# Patient Record
Sex: FEMALE | Race: WHITE | Employment: OTHER | URBAN - METROPOLITAN AREA
[De-identification: names, ages, dates, MRNs, and addresses within clinical notes are randomized per-mention and may not be internally consistent; named-entity substitution may affect disease eponyms.]

---

## 2023-02-07 DIAGNOSIS — M25.562 LEFT KNEE PAIN, UNSPECIFIED CHRONICITY: Primary | ICD-10-CM

## 2023-02-08 ENCOUNTER — OFFICE VISIT (OUTPATIENT)
Dept: ORTHOPEDIC SURGERY | Age: 78
End: 2023-02-08
Payer: MEDICARE

## 2023-02-08 ENCOUNTER — TELEPHONE (OUTPATIENT)
Dept: ORTHOPEDIC SURGERY | Age: 78
End: 2023-02-08

## 2023-02-08 VITALS — BODY MASS INDEX: 28.25 KG/M2 | WEIGHT: 180 LBS | TEMPERATURE: 98 F | HEIGHT: 67 IN

## 2023-02-08 DIAGNOSIS — M17.12 PRIMARY OSTEOARTHRITIS OF LEFT KNEE: Primary | ICD-10-CM

## 2023-02-08 PROCEDURE — 99204 OFFICE O/P NEW MOD 45 MIN: CPT | Performed by: ORTHOPAEDIC SURGERY

## 2023-02-08 PROCEDURE — 1123F ACP DISCUSS/DSCN MKR DOCD: CPT | Performed by: ORTHOPAEDIC SURGERY

## 2023-02-08 RX ORDER — OMEPRAZOLE 20 MG/1
CAPSULE, DELAYED RELEASE ORAL
COMMUNITY
Start: 2022-12-21

## 2023-02-08 RX ORDER — PROPAFENONE HYDROCHLORIDE 150 MG/1
TABLET, FILM COATED ORAL
COMMUNITY
Start: 2023-02-06

## 2023-02-08 RX ORDER — LETROZOLE 2.5 MG/1
TABLET, FILM COATED ORAL
COMMUNITY
Start: 2022-11-06

## 2023-02-08 RX ORDER — ASPIRIN 81 MG/1
81 TABLET ORAL DAILY
COMMUNITY

## 2023-02-08 RX ORDER — CLOPIDOGREL BISULFATE 75 MG/1
TABLET ORAL
COMMUNITY
Start: 2023-02-03

## 2023-02-08 NOTE — TELEPHONE ENCOUNTER
Prior Authorization Form:      DEMOGRAPHICS:                     Patient Name:  Mercy Reeves  Patient :  1945            Insurance:  Payor: Jennifer Thomas / Plan: [address] / Product Type: *No Product type* /   Insurance ID Number:    Payer/Plan Subscr  Sex Relation Sub. Ins. ID Effective Group Num   1.  BCBS MEDICARESudha Monzon A 1945 Female Self [id number] [id number]                                    [id number]         DIAGNOSIS & PROCEDURE:                       Procedure/Operation: TOTAL LEFT KNEE REPLACEMENT ARTHROPLASTY           CPT Code: 57224    Diagnosis:  DEGENERATION LEFT KNEE    ICD10 Code: M17.12    Location:  [facility]    Surgeon:  Jeanne Daniel D.O.    SCHEDULING INFORMATION:                          Date: 2023    Time: TBA                Anesthesia:  Spinal                                                       Status:  Outpatient        Special Comments:  Julito Evans 13.       Electronically signed by Amanda Klinefelter on 2023 at 1:42 PM

## 2023-02-08 NOTE — PROGRESS NOTES
Chao Nelson is a 76 y.o. female, who presents   Chief Complaint   Patient presents with    Knee Pain     Left knee pain for for a while over a few years comes and goes. HPI[de-identified] Knee pains been present for quite some time. Mrs. Candice Voss was taking care of her  who was on dialysis and passed in January. The knee pain is gotten worse over time and is disabling at this point she uses a wheeled seated walker as an ambulatory aid. She is basically healthy at this point and would like to plan some travel and be active throughout the rest of her life. The left knee is interfering with this    Allergies; medications; past medical, surgical, family, and social history; and problem list have been reviewed today and updated as indicated in this encounter - see below following Ortho specifics. Musculoskeletal: Skin condition gross neurovascular functions good in the left lower extremity. Hip range of motion is good without pain. There is no instability. The left knee range of motion is 0 to 120 degrees of flexion. There is significant medial gapping with stress examination. Collateral crucial ligaments are grossly stable. There is crepitus throughout the range of motion as well. He does have discomfort with stressing Keyshawn testing. Radiologic Studies: Imaging of the left knee and 2 views with AP weightbearing shows significant varus malalignment with osteopenia. There are hypertrophic marginal changes medial and patellofemoral.  She has bone-on-bone contact in the medial compartment. ASSESSMENT:  Horacio Ritchie was seen today for knee pain. Diagnoses and all orders for this visit:    Primary osteoarthritis of left knee     Treatment alternatives were reviewed including medical and physical therapies, injections, and surgical options, expected risks benefits and likely outcome of each were discussed in detail, questions asked and answered and understood.   We discussed the symptoms well physical findings and imaging results. There is advanced arthrosis in the left knee. We discussed treatment options and she wants a solution rather than palliative care. This would be a total knee replacement which was discussed in detail with her with parent good understanding. PLAN: We will plan left total knee replacement arthroplasty. There is no problem list on file for this patient. History reviewed. No pertinent past medical history. History reviewed. No pertinent surgical history. Current Outpatient Medications   Medication Sig Dispense Refill    propafenone (RYTHMOL) 150 MG tablet       omeprazole (PRILOSEC) 20 MG delayed release capsule TAKE 1 CAPSULE BY MOUTH EVERY DAY FOR ACID REFLUX      letrozole (FEMARA) 2.5 MG tablet TAKE 1 TABLET BY MOUTH DAILY      clopidogrel (PLAVIX) 75 MG tablet       aspirin 81 MG EC tablet Take 81 mg by mouth daily       No current facility-administered medications for this visit. No Known Allergies    Social History     Socioeconomic History    Marital status:      Spouse name: None    Number of children: None    Years of education: None    Highest education level: None       History reviewed. No pertinent family history. Review of Systems:   As follows except as previously noted in HPI:  Constitutional: Negative for chills, diaphoresis,  fever   Respiratory: Negative for cough, shortness of breath and wheezing. Cardiovascular: Negative for chest pain and palpitations. Neurological: Negative for dizziness, syncope,   GI / : abdominal pain or cramping  Musculoskeletal: see HPI       Objective:   Physical Exam   Constitutional: Oriented to person, place, and time. and appears well-developed and well-nourished. :   Head: Normocephalic and atraumatic. Neck: Neck supple. Eyes: EOM are normal.   Pulmonary/Chest: Effort normal.  No respiratory distress, no wheezes. Neurological: Alert and oriented to person  Skin: Skin is warm and dry. David Dorsey DO    2/8/23  10:37 AM    All reasonable efforts have been made to minimize the risk of errors that may occur in the use of voice recognition and other electronic means of charting.

## 2023-03-07 RX ORDER — SODIUM CHLORIDE, SODIUM LACTATE, POTASSIUM CHLORIDE, CALCIUM CHLORIDE 600; 310; 30; 20 MG/100ML; MG/100ML; MG/100ML; MG/100ML
INJECTION, SOLUTION INTRAVENOUS CONTINUOUS
OUTPATIENT
Start: 2023-03-14

## 2023-03-08 RX ORDER — SODIUM CHLORIDE 0.9 % (FLUSH) 0.9 %
5-40 SYRINGE (ML) INJECTION EVERY 12 HOURS SCHEDULED
OUTPATIENT
Start: 2023-03-08

## 2023-03-08 RX ORDER — SODIUM CHLORIDE 0.9 % (FLUSH) 0.9 %
5-40 SYRINGE (ML) INJECTION PRN
OUTPATIENT
Start: 2023-03-08

## 2023-03-08 RX ORDER — SODIUM CHLORIDE 9 MG/ML
INJECTION, SOLUTION INTRAVENOUS PRN
OUTPATIENT
Start: 2023-03-08

## 2023-03-08 RX ORDER — SCOLOPAMINE TRANSDERMAL SYSTEM 1 MG/1
1 PATCH, EXTENDED RELEASE TRANSDERMAL
OUTPATIENT
Start: 2023-03-08 | End: 2023-03-11

## 2023-03-09 ENCOUNTER — HOSPITAL ENCOUNTER (OUTPATIENT)
Dept: PREADMISSION TESTING | Age: 78
Discharge: HOME | End: 2023-03-09
Payer: MEDICARE

## 2023-03-09 ENCOUNTER — ANESTHESIA EVENT (OUTPATIENT)
Dept: OPERATING ROOM | Age: 78
End: 2023-03-09
Payer: MEDICARE

## 2023-03-09 ENCOUNTER — HOSPITAL ENCOUNTER (OUTPATIENT)
Dept: GENERAL RADIOLOGY | Age: 78
Discharge: HOME | End: 2023-03-11
Payer: MEDICARE

## 2023-03-09 VITALS
RESPIRATION RATE: 18 BRPM | WEIGHT: 203.5 LBS | BODY MASS INDEX: 31.87 KG/M2 | DIASTOLIC BLOOD PRESSURE: 79 MMHG | SYSTOLIC BLOOD PRESSURE: 152 MMHG | TEMPERATURE: 97.8 F | HEART RATE: 64 BPM | OXYGEN SATURATION: 97 %

## 2023-03-09 DIAGNOSIS — M17.12 OSTEOARTHRITIS OF LEFT KNEE, UNSPECIFIED OSTEOARTHRITIS TYPE: ICD-10-CM

## 2023-03-09 DIAGNOSIS — M17.12 PRIMARY OSTEOARTHRITIS OF LEFT KNEE: Primary | ICD-10-CM

## 2023-03-09 LAB
ALBUMIN SERPL-MCNC: 4.1 G/DL (ref 3.5–5.2)
ALP BLD-CCNC: 79 U/L (ref 35–104)
ALT SERPL-CCNC: 10 U/L (ref 0–32)
ANION GAP SERPL CALCULATED.3IONS-SCNC: 7 MMOL/L (ref 7–16)
APTT: 32.9 SEC (ref 24.5–35.1)
AST SERPL-CCNC: 15 U/L (ref 0–31)
BASOPHILS ABSOLUTE: 0.02 E9/L (ref 0–0.2)
BASOPHILS RELATIVE PERCENT: 0.3 % (ref 0–2)
BILIRUB SERPL-MCNC: 0.4 MG/DL (ref 0–1.2)
BILIRUBIN URINE: NEGATIVE
BLOOD, URINE: NEGATIVE
BUN BLDV-MCNC: 12 MG/DL (ref 6–23)
CALCIUM SERPL-MCNC: 9.4 MG/DL (ref 8.6–10.2)
CHLORIDE BLD-SCNC: 108 MMOL/L (ref 98–107)
CLARITY: CLEAR
CO2: 27 MMOL/L (ref 22–29)
COLOR: YELLOW
CREAT SERPL-MCNC: 0.7 MG/DL (ref 0.5–1)
EOSINOPHILS ABSOLUTE: 0.12 E9/L (ref 0.05–0.5)
EOSINOPHILS RELATIVE PERCENT: 2 % (ref 0–6)
GFR SERPL CREATININE-BSD FRML MDRD: >60 ML/MIN/1.73
GLUCOSE BLD-MCNC: 87 MG/DL (ref 74–99)
GLUCOSE URINE: NEGATIVE MG/DL
HBA1C MFR BLD: 5.1 % (ref 4–5.6)
HCT VFR BLD CALC: 46 % (ref 34–48)
HEMOGLOBIN: 14.3 G/DL (ref 11.5–15.5)
IMMATURE GRANULOCYTES #: 0.02 E9/L
IMMATURE GRANULOCYTES %: 0.3 % (ref 0–5)
INR BLD: 0.9
KETONES, URINE: NEGATIVE MG/DL
LEUKOCYTE ESTERASE, URINE: NEGATIVE
LYMPHOCYTES ABSOLUTE: 1.44 E9/L (ref 1.5–4)
LYMPHOCYTES RELATIVE PERCENT: 23.8 % (ref 20–42)
MCH RBC QN AUTO: 29.1 PG (ref 26–35)
MCHC RBC AUTO-ENTMCNC: 31.1 % (ref 32–34.5)
MCV RBC AUTO: 93.7 FL (ref 80–99.9)
MONOCYTES ABSOLUTE: 0.47 E9/L (ref 0.1–0.95)
MONOCYTES RELATIVE PERCENT: 7.8 % (ref 2–12)
NEUTROPHILS ABSOLUTE: 3.97 E9/L (ref 1.8–7.3)
NEUTROPHILS RELATIVE PERCENT: 65.8 % (ref 43–80)
NITRITE, URINE: NEGATIVE
PDW BLD-RTO: 14.8 FL (ref 11.5–15)
PH UA: 6 (ref 5–9)
PLATELET # BLD: 208 E9/L (ref 130–450)
PMV BLD AUTO: 11 FL (ref 7–12)
POTASSIUM REFLEX MAGNESIUM: 4.4 MMOL/L (ref 3.5–5)
PREALBUMIN: 23 MG/DL (ref 20–40)
PROTEIN UA: NEGATIVE MG/DL
PROTHROMBIN TIME: 10.8 SEC (ref 9.3–12.4)
RBC # BLD: 4.91 E12/L (ref 3.5–5.5)
SODIUM BLD-SCNC: 142 MMOL/L (ref 132–146)
SPECIFIC GRAVITY UA: >=1.03 (ref 1–1.03)
TOTAL PROTEIN: 7 G/DL (ref 6.4–8.3)
UROBILINOGEN, URINE: 0.2 E.U./DL
WBC # BLD: 6 E9/L (ref 4.5–11.5)

## 2023-03-09 PROCEDURE — 36415 COLL VENOUS BLD VENIPUNCTURE: CPT

## 2023-03-09 PROCEDURE — 84134 ASSAY OF PREALBUMIN: CPT

## 2023-03-09 PROCEDURE — 85025 COMPLETE CBC W/AUTO DIFF WBC: CPT

## 2023-03-09 PROCEDURE — 87088 URINE BACTERIA CULTURE: CPT

## 2023-03-09 PROCEDURE — 81003 URINALYSIS AUTO W/O SCOPE: CPT

## 2023-03-09 PROCEDURE — 80053 COMPREHEN METABOLIC PANEL: CPT

## 2023-03-09 PROCEDURE — 83036 HEMOGLOBIN GLYCOSYLATED A1C: CPT

## 2023-03-09 PROCEDURE — 85730 THROMBOPLASTIN TIME PARTIAL: CPT

## 2023-03-09 PROCEDURE — 85610 PROTHROMBIN TIME: CPT

## 2023-03-09 PROCEDURE — 87081 CULTURE SCREEN ONLY: CPT

## 2023-03-09 PROCEDURE — 71046 X-RAY EXAM CHEST 2 VIEWS: CPT

## 2023-03-09 RX ORDER — MIDAZOLAM HYDROCHLORIDE 1 MG/ML
2 INJECTION INTRAMUSCULAR; INTRAVENOUS ONCE
OUTPATIENT
Start: 2023-03-14

## 2023-03-09 RX ORDER — FENTANYL CITRATE 50 UG/ML
100 INJECTION, SOLUTION INTRAMUSCULAR; INTRAVENOUS ONCE
OUTPATIENT
Start: 2023-03-14

## 2023-03-09 RX ORDER — ROPIVACAINE HYDROCHLORIDE 5 MG/ML
30 INJECTION, SOLUTION EPIDURAL; INFILTRATION; PERINEURAL ONCE
OUTPATIENT
Start: 2023-03-14

## 2023-03-09 ASSESSMENT — PAIN DESCRIPTION - PAIN TYPE: TYPE: CHRONIC PAIN

## 2023-03-09 ASSESSMENT — PAIN DESCRIPTION - ONSET: ONSET: ON-GOING

## 2023-03-09 ASSESSMENT — PAIN SCALES - GENERAL: PAINLEVEL_OUTOF10: 5

## 2023-03-09 ASSESSMENT — PAIN DESCRIPTION - LOCATION: LOCATION: KNEE

## 2023-03-09 ASSESSMENT — PAIN DESCRIPTION - DESCRIPTORS: DESCRIPTORS: ACHING

## 2023-03-09 ASSESSMENT — PAIN DESCRIPTION - ORIENTATION: ORIENTATION: LEFT

## 2023-03-09 NOTE — ANESTHESIA PRE PROCEDURE
Department of Anesthesiology  Preprocedure Note       Name:  Jay Galdamez   Age:  76 y.o.  :  1945                                          MRN:  [id number]         Date:  3/9/2023      Surgeon: Oscar Nova):  JOLEEN Swan DO    Procedure: Procedure(s):  LEFT KNEE TOTAL ARTHROPLASTY    Medications prior to admission:   Prior to Admission medications    Medication Sig Start Date End Date Taking? Authorizing Provider   propafenone (RYTHMOL) 150 MG tablet Take 150 mg by mouth in the morning and 150 mg at noon and 150 mg in the evening. 23   Historical Provider, MD   omeprazole (PRILOSEC) 20 MG delayed release capsule Take 20 mg by mouth 2 times daily as needed 22   Historical Provider, MD   letrozole [facility]) 2.5 MG tablet TAKE 1 TABLET BY MOUTH DAILY 22   Historical Provider, MD   clopidogrel (PLAVIX) 75 MG tablet Take 75 mg by mouth daily 2/3/23   Historical Provider, MD   aspirin 81 MG EC tablet Take 81 mg by mouth daily    Historical Provider, MD       Current medications:    Current Outpatient Medications   Medication Sig Dispense Refill    propafenone (RYTHMOL) 150 MG tablet Take 150 mg by mouth in the morning and 150 mg at noon and 150 mg in the evening.  omeprazole (PRILOSEC) 20 MG delayed release capsule Take 20 mg by mouth 2 times daily as needed      letrozole (FEMARA) 2.5 MG tablet TAKE 1 TABLET BY MOUTH DAILY      clopidogrel (PLAVIX) 75 MG tablet Take 75 mg by mouth daily      aspirin 81 MG EC tablet Take 81 mg by mouth daily       No current facility-administered medications for this encounter.        Allergies:  No Known Allergies    Problem List:    Patient Active Problem List   Diagnosis Code    Degenerative arthritis of left knee M17.12       Past Medical History:        Diagnosis Date    [facility] [address]     Arthritis     Cancer ([facility] [address]     BREAST    GERD (gastroesophageal reflux disease)     Osteoporosis     Thoracic aortic aneurysm        Past Surgical History: Procedure Laterality Date    BREAST SURGERY      lumpectomy right breast    COLONOSCOPY      ENDOSCOPY, COLON, DIAGNOSTIC      VASCULAR SURGERY      thoracic aortic aneurysm repair       Social History:    Social History     Tobacco Use    Smoking status: Never    Smokeless tobacco: Never   Substance Use Topics    Alcohol use: Not Currently                                Counseling given: Not Answered      Vital Signs (Current):   Vitals:    03/09/23 0810   BP: (!) 152/79   Pulse: 64   Resp: 18   Temp: 97.8 °F (36.6 °C)   TempSrc: Infrared   SpO2: 97%   Weight: 203 lb 8 oz (92.3 kg)                                              BP Readings from Last 3 Encounters:   03/09/23 (!) 152/79       NPO Status:                                                                                 BMI:   Wt Readings from Last 3 Encounters:   03/09/23 203 lb 8 oz (92.3 kg)   02/08/23 180 lb (81.6 kg)     Body mass index is 31.87 kg/m². CBC: No results found for: WBC, RBC, HGB, HCT, MCV, RDW, PLT    CMP: No results found for: NA, K, CL, CO2, BUN, CREATININE, GFRAA, AGRATIO, LABGLOM, GLUCOSE, GLU, PROT, CALCIUM, BILITOT, ALKPHOS, AST, ALT    POC Tests: No results for input(s): POCGLU, POCNA, POCK, POCCL, POCBUN, POCHEMO, POCHCT in the last 72 hours.     Coags: No results found for: PROTIME, INR, APTT    HCG (If Applicable): No results found for: PREGTESTUR, PREGSERUM, HCG, HCGQUANT     ABGs: No results found for: PHART, PO2ART, RCQ4WQT, PUX0NJV, BEART, I6FTPUDG     Type & Screen (If Applicable):  No results found for: LABABO, LABRH    Drug/Infectious Status (If Applicable):  No results found for: HIV, HEPCAB    COVID-19 Screening (If Applicable): No results found for: COVID19        Anesthesia Evaluation  Patient summary reviewed  Airway: Mallampati: III  TM distance: >3 FB     Mouth opening: > = 3 FB   Dental:    (+) partials and upper dentures      Pulmonary:Negative Pulmonary ROS breath sounds clear to auscultation                             Cardiovascular:Negative CV ROS          ECG reviewed  Rhythm: regular  Rate: normal    Stress test reviewed  Cleared by cardiology              Neuro/Psych:   Negative Neuro/Psych ROS              GI/Hepatic/Renal:   (+) GERD:,           Endo/Other:    (+) : arthritis:., .                 Abdominal:   (+) obese,           Vascular:   + PVD, aortic or cerebral, . Other Findings: Missing teeth. Anesthesia Plan      general and spinal     ASA 3     (Pt accepting of spinal with general backup and adductor canal block for LTKR.)  Induction: intravenous. Anesthetic plan and risks discussed with patient.                         Delores Mendez MD   3/9/2023

## 2023-03-09 NOTE — PROGRESS NOTES
[address] [facility]                                                                                                                    PRE OP INSTRUCTIONS FOR  Zahra Cancel        Date: 3/9/2023    Date of surgery: 3/14/23   Arrival Time: Hospital will call you between 5pm and 7pm Monday evening with your final arrival time for surgery    Do not eat or drink anything after midnight prior to surgery. This includes no water, chewing gum, mints or ice chips. Take the following medications with a small sip of water on the morning of Surgery: Rhythmol, Omeprazole     Diabetics may take evening dose of insulin but none after midnight. If you feel symptomatic or low blood sugar morning of surgery drink 1-2 ounces of apple juice only. Aspirin, Ibuprofen, Advil, Naproxen, Vitamin E and other Anti-inflammatory products should be stopped  before surgery  as directed by your physician. Take Tylenol only unless instructed otherwise by your surgeon. Check with your Doctor regarding stopping Plavix, Coumadin, Lovenox, Eliquis, Effient, or other blood thinners. Do not smoke,use illicit drugs and do not drink any alcoholic beverages 24 hours prior to surgery. You may brush your teeth the morning of surgery. DO NOT SWALLOW WATER    You MUST make arrangements for a responsible adult to take you home after your surgery. You will not be allowed to leave alone or drive yourself home. It is strongly suggested someone stay with you the first 24 hrs. Your surgery will be cancelled if you do not have a ride home. PEDIATRIC PATIENTS ONLY:  A parent/legal guardian must accompany a child scheduled for surgery and plan to stay at the hospital until the child is discharged. Please do not bring other children with you.     Please wear simple, loose fitting clothing to the hospital.  Maritza Nilton not bring valuables (money, credit cards, checkbooks, etc.) Do not wear any makeup (including no eye makeup) or nail polish on your fingers or toes. DO NOT wear any jewelry or piercings on day of surgery. All body piercing jewelry must be removed. Shower the night before surgery with _x__Antibacterial soap /AC WIPES___x_____    TOTAL JOINT REPLACEMENT/HYSTERECTOMY PATIENTS ONLY---Remember to bring Blood Bank bracelet to the hospital on the day of surgery. If you have a Living Will and Durable Power of  for Healthcare, please bring in a copy. If appropriate bring crutches, inspirex, WALKER, CANE etc... Notify your Surgeon if you develop any illness between now and surgery time, cough, cold, fever, sore throat, nausea, vomiting, etc.  Please notify your surgeon if you experience dizziness, shortness of breath or blurred vision between now & the time of your surgery. If you have _x__dentures, they will be removed before going to the OR; we will provide you a container. If you wear ___contact lenses or _x__glasses, they will be removed; please bring a case for them. To provide excellent care visitors will be limited to 2 in the room at any given time. Please bring picture ID and insurance card. Sleep apnea patients need to bring CPAP AND SETTINGS to hospital on day of surgery. During flu season no children under the age of 15 are permitted in the hospital for the safety of all patients. Other                   Please call AMBULATORY CARE if you have any further questions.    [facility]     [address]

## 2023-03-09 NOTE — PROGRESS NOTES
Patient attended preoperative Total Joint Camp on 3/9/2023. Patient is scheduled to have an elective knee replacement. Patient was educated regarding Disease Process, Medications, Smoking Cessation, Oxygenation, Incentive Spirometry and Deep Breath and Cough, signs and symptoms of postoperative joint infection that include: Fever, Chills, Pain Control, Drainage and Redness, post-op follow up with orthopaedic surgeon, dressing removal, staple removal, ambulatory devices which include a wheeled walker and cane, bed mobility, correct anatomical alignment, active range of motion, proper transferring technique, incision care, infection prevention measures, non-pharmacologic comfort measures, notification of inadequate pain control measures, pain scale for assessing level of pain, pharmacologic pain management, relaxation techniques.

## 2023-03-09 NOTE — CARE COORDINATION
RAAD met with patient in [facility]. She is scheduled for an elective surgery on Tuesday 3/14/23 with Dr Abram Guzman. She states she lives home with her grandson in a 2 story home but she stays completely on the 1st floor. She states she has a stair lift from the garage to get into the house that she will be using. She has a walk in shower that she will be using. She has a toilet riser. She states she uses a Rollator at home normally. She will need a Wheeled Walker and a Shower Chair and she would like to use [employer]. Referral to [place] at [facility], [facility] [address] at [facility]. Discussed need for [facility] and she is agreeable, states history of [facility] and would like to use again. Referral to [place] at [facility], [address] at [facility]. Patients daughter, Dayanara Cook will transport day of surgery and patient plans to DC home same day.

## 2023-03-10 LAB — MRSA CULTURE ONLY: NORMAL

## 2023-03-11 LAB — URINE CULTURE, ROUTINE: NORMAL

## 2023-03-14 ENCOUNTER — HOSPITAL ENCOUNTER (OUTPATIENT)
Age: 78
Setting detail: OUTPATIENT SURGERY
Discharge: HOME | End: 2023-03-14
Attending: ORTHOPAEDIC SURGERY | Admitting: ORTHOPAEDIC SURGERY
Payer: MEDICARE

## 2023-03-14 ENCOUNTER — ANESTHESIA (OUTPATIENT)
Dept: OPERATING ROOM | Age: 78
End: 2023-03-14
Payer: MEDICARE

## 2023-03-14 ENCOUNTER — APPOINTMENT (OUTPATIENT)
Dept: GENERAL RADIOLOGY | Age: 78
End: 2023-03-14
Attending: ORTHOPAEDIC SURGERY
Payer: MEDICARE

## 2023-03-14 VITALS
DIASTOLIC BLOOD PRESSURE: 68 MMHG | HEART RATE: 71 BPM | SYSTOLIC BLOOD PRESSURE: 140 MMHG | OXYGEN SATURATION: 96 % | TEMPERATURE: 98.3 F | RESPIRATION RATE: 16 BRPM

## 2023-03-14 DIAGNOSIS — M17.12 PRIMARY OSTEOARTHRITIS OF LEFT KNEE: Primary | ICD-10-CM

## 2023-03-14 DIAGNOSIS — M17.12 DEGENERATIVE ARTHRITIS OF LEFT KNEE: ICD-10-CM

## 2023-03-14 DIAGNOSIS — Z96.652 S/P TOTAL KNEE ARTHROPLASTY, LEFT: ICD-10-CM

## 2023-03-14 LAB
ALBUMIN SERPL-MCNC: 3.9 G/DL (ref 3.5–5.2)
ALP BLD-CCNC: 66 U/L (ref 35–104)
ALT SERPL-CCNC: 11 U/L (ref 0–32)
ANION GAP SERPL CALCULATED.3IONS-SCNC: 7 MMOL/L (ref 7–16)
AST SERPL-CCNC: 28 U/L (ref 0–31)
BILIRUB SERPL-MCNC: 0.5 MG/DL (ref 0–1.2)
BUN BLDV-MCNC: 9 MG/DL (ref 6–23)
CALCIUM SERPL-MCNC: 9.2 MG/DL (ref 8.6–10.2)
CHLORIDE BLD-SCNC: 105 MMOL/L (ref 98–107)
CO2: 28 MMOL/L (ref 22–29)
CREAT SERPL-MCNC: 0.7 MG/DL (ref 0.5–1)
GFR SERPL CREATININE-BSD FRML MDRD: >60 ML/MIN/1.73
GLUCOSE BLD-MCNC: 84 MG/DL (ref 74–99)
POTASSIUM SERPL-SCNC: 5.4 MMOL/L (ref 3.5–5)
SODIUM BLD-SCNC: 140 MMOL/L (ref 132–146)
TOTAL PROTEIN: 6.9 G/DL (ref 6.4–8.3)

## 2023-03-14 PROCEDURE — 3600000005 HC SURGERY LEVEL 5 BASE: Performed by: ORTHOPAEDIC SURGERY

## 2023-03-14 PROCEDURE — 6370000000 HC RX 637 (ALT 250 FOR IP): Performed by: ORTHOPAEDIC SURGERY

## 2023-03-14 PROCEDURE — 80053 COMPREHEN METABOLIC PANEL: CPT

## 2023-03-14 PROCEDURE — 3700000000 HC ANESTHESIA ATTENDED CARE: Performed by: ORTHOPAEDIC SURGERY

## 2023-03-14 PROCEDURE — 6360000002 HC RX W HCPCS: Performed by: ORTHOPAEDIC SURGERY

## 2023-03-14 PROCEDURE — 36415 COLL VENOUS BLD VENIPUNCTURE: CPT

## 2023-03-14 PROCEDURE — 64447 NJX AA&/STRD FEMORAL NRV IMG: CPT | Performed by: ANESTHESIOLOGY

## 2023-03-14 PROCEDURE — 97535 SELF CARE MNGMENT TRAINING: CPT

## 2023-03-14 PROCEDURE — 88305 TISSUE EXAM BY PATHOLOGIST: CPT

## 2023-03-14 PROCEDURE — 3700000001 HC ADD 15 MINUTES (ANESTHESIA): Performed by: ORTHOPAEDIC SURGERY

## 2023-03-14 PROCEDURE — 6360000002 HC RX W HCPCS

## 2023-03-14 PROCEDURE — 97110 THERAPEUTIC EXERCISES: CPT | Performed by: PHYSICAL THERAPIST

## 2023-03-14 PROCEDURE — 6370000000 HC RX 637 (ALT 250 FOR IP): Performed by: ANESTHESIOLOGY

## 2023-03-14 PROCEDURE — 2500000003 HC RX 250 WO HCPCS: Performed by: ANESTHESIOLOGY

## 2023-03-14 PROCEDURE — 7100000010 HC PHASE II RECOVERY - FIRST 15 MIN: Performed by: ORTHOPAEDIC SURGERY

## 2023-03-14 PROCEDURE — 97530 THERAPEUTIC ACTIVITIES: CPT | Performed by: PHYSICAL THERAPIST

## 2023-03-14 PROCEDURE — 88311 DECALCIFY TISSUE: CPT

## 2023-03-14 PROCEDURE — 2580000003 HC RX 258: Performed by: NURSE ANESTHETIST, CERTIFIED REGISTERED

## 2023-03-14 PROCEDURE — 97161 PT EVAL LOW COMPLEX 20 MIN: CPT | Performed by: PHYSICAL THERAPIST

## 2023-03-14 PROCEDURE — 7100000000 HC PACU RECOVERY - FIRST 15 MIN: Performed by: ORTHOPAEDIC SURGERY

## 2023-03-14 PROCEDURE — 6360000002 HC RX W HCPCS: Performed by: NURSE ANESTHETIST, CERTIFIED REGISTERED

## 2023-03-14 PROCEDURE — C1713 ANCHOR/SCREW BN/BN,TIS/BN: HCPCS | Performed by: ORTHOPAEDIC SURGERY

## 2023-03-14 PROCEDURE — 6360000002 HC RX W HCPCS: Performed by: ANESTHESIOLOGY

## 2023-03-14 PROCEDURE — 27447 TOTAL KNEE ARTHROPLASTY: CPT | Performed by: ORTHOPAEDIC SURGERY

## 2023-03-14 PROCEDURE — 7100000011 HC PHASE II RECOVERY - ADDTL 15 MIN: Performed by: ORTHOPAEDIC SURGERY

## 2023-03-14 PROCEDURE — 3600000015 HC SURGERY LEVEL 5 ADDTL 15MIN: Performed by: ORTHOPAEDIC SURGERY

## 2023-03-14 PROCEDURE — 97530 THERAPEUTIC ACTIVITIES: CPT

## 2023-03-14 PROCEDURE — 7100000001 HC PACU RECOVERY - ADDTL 15 MIN: Performed by: ORTHOPAEDIC SURGERY

## 2023-03-14 PROCEDURE — 2580000003 HC RX 258: Performed by: ANESTHESIOLOGY

## 2023-03-14 PROCEDURE — 73560 X-RAY EXAM OF KNEE 1 OR 2: CPT

## 2023-03-14 PROCEDURE — 2500000003 HC RX 250 WO HCPCS: Performed by: ORTHOPAEDIC SURGERY

## 2023-03-14 PROCEDURE — 2709999900 HC NON-CHARGEABLE SUPPLY: Performed by: ORTHOPAEDIC SURGERY

## 2023-03-14 PROCEDURE — 2580000003 HC RX 258: Performed by: ORTHOPAEDIC SURGERY

## 2023-03-14 PROCEDURE — C1776 JOINT DEVICE (IMPLANTABLE): HCPCS | Performed by: ORTHOPAEDIC SURGERY

## 2023-03-14 PROCEDURE — 97165 OT EVAL LOW COMPLEX 30 MIN: CPT

## 2023-03-14 DEVICE — CEMENTED STEM
Type: IMPLANTABLE DEVICE | Site: KNEE | Status: FUNCTIONAL
Brand: TRIATHLON

## 2023-03-14 DEVICE — SYMMETRIC PATELLA
Type: IMPLANTABLE DEVICE | Site: KNEE | Status: FUNCTIONAL
Brand: TRIATHLON

## 2023-03-14 DEVICE — COMPONENT PART KNEE CAPPED K1 STRYKER: Type: IMPLANTABLE DEVICE | Status: FUNCTIONAL

## 2023-03-14 DEVICE — CEMENT BNE 20ML 40GM FULL DOSE PMMA W/O ANTIBIO M VISC: Type: IMPLANTABLE DEVICE | Site: KNEE | Status: FUNCTIONAL

## 2023-03-14 DEVICE — UPCHARGE KNEE X3 LINER STRYKER: Type: IMPLANTABLE DEVICE | Status: FUNCTIONAL

## 2023-03-14 DEVICE — POSTERIOR STABILIZED FEMORAL
Type: IMPLANTABLE DEVICE | Site: KNEE | Status: FUNCTIONAL
Brand: TRIATHLON

## 2023-03-14 DEVICE — UNIVERSAL TIBIAL BASEPLATE
Type: IMPLANTABLE DEVICE | Site: KNEE | Status: FUNCTIONAL
Brand: TRIATHLON

## 2023-03-14 DEVICE — TIBIAL BEARING INSERT - PS
Type: IMPLANTABLE DEVICE | Status: FUNCTIONAL
Brand: TRIATHLON

## 2023-03-14 RX ORDER — PANTOPRAZOLE SODIUM 40 MG/1
40 TABLET, DELAYED RELEASE ORAL
Status: CANCELLED | OUTPATIENT
Start: 2023-03-14

## 2023-03-14 RX ORDER — DEXAMETHASONE SODIUM PHOSPHATE 10 MG/ML
INJECTION INTRAMUSCULAR; INTRAVENOUS
Status: DISCONTINUED
Start: 2023-03-14 | End: 2023-03-14 | Stop reason: HOSPADM

## 2023-03-14 RX ORDER — SODIUM CHLORIDE 9 MG/ML
INJECTION, SOLUTION INTRAVENOUS PRN
Status: DISCONTINUED | OUTPATIENT
Start: 2023-03-14 | End: 2023-03-14 | Stop reason: HOSPADM

## 2023-03-14 RX ORDER — ROPIVACAINE HYDROCHLORIDE 5 MG/ML
30 INJECTION, SOLUTION EPIDURAL; INFILTRATION; PERINEURAL ONCE
Status: DISCONTINUED | OUTPATIENT
Start: 2023-03-14 | End: 2023-03-14 | Stop reason: HOSPADM

## 2023-03-14 RX ORDER — VANCOMYCIN HYDROCHLORIDE 1 G/20ML
INJECTION, POWDER, LYOPHILIZED, FOR SOLUTION INTRAVENOUS PRN
Status: DISCONTINUED | OUTPATIENT
Start: 2023-03-14 | End: 2023-03-14 | Stop reason: ALTCHOICE

## 2023-03-14 RX ORDER — ONDANSETRON 4 MG/1
4 TABLET, ORALLY DISINTEGRATING ORAL EVERY 8 HOURS PRN
Status: CANCELLED | OUTPATIENT
Start: 2023-03-14

## 2023-03-14 RX ORDER — CLOPIDOGREL BISULFATE 75 MG/1
75 TABLET ORAL DAILY
Status: CANCELLED | OUTPATIENT
Start: 2023-03-14

## 2023-03-14 RX ORDER — LETROZOLE 2.5 MG/1
1 TABLET, FILM COATED ORAL DAILY
Status: CANCELLED | OUTPATIENT
Start: 2023-03-14

## 2023-03-14 RX ORDER — MIDAZOLAM HYDROCHLORIDE 1 MG/ML
INJECTION INTRAMUSCULAR; INTRAVENOUS
Status: COMPLETED
Start: 2023-03-14 | End: 2023-03-14

## 2023-03-14 RX ORDER — SODIUM CHLORIDE 0.9 % (FLUSH) 0.9 %
5-40 SYRINGE (ML) INJECTION PRN
Status: DISCONTINUED | OUTPATIENT
Start: 2023-03-14 | End: 2023-03-14 | Stop reason: HOSPADM

## 2023-03-14 RX ORDER — MORPHINE SULFATE 2 MG/ML
2 INJECTION, SOLUTION INTRAMUSCULAR; INTRAVENOUS
Status: CANCELLED | OUTPATIENT
Start: 2023-03-14

## 2023-03-14 RX ORDER — SODIUM CHLORIDE 0.9 % (FLUSH) 0.9 %
5-40 SYRINGE (ML) INJECTION EVERY 12 HOURS SCHEDULED
Status: CANCELLED | OUTPATIENT
Start: 2023-03-14

## 2023-03-14 RX ORDER — OXYCODONE HYDROCHLORIDE 5 MG/1
5 TABLET ORAL EVERY 4 HOURS PRN
Status: DISCONTINUED | OUTPATIENT
Start: 2023-03-14 | End: 2023-03-14 | Stop reason: HOSPADM

## 2023-03-14 RX ORDER — SODIUM CHLORIDE 0.9 % (FLUSH) 0.9 %
5-40 SYRINGE (ML) INJECTION EVERY 12 HOURS SCHEDULED
Status: DISCONTINUED | OUTPATIENT
Start: 2023-03-14 | End: 2023-03-14 | Stop reason: HOSPADM

## 2023-03-14 RX ORDER — SCOLOPAMINE TRANSDERMAL SYSTEM 1 MG/1
1 PATCH, EXTENDED RELEASE TRANSDERMAL
Status: DISCONTINUED | OUTPATIENT
Start: 2023-03-14 | End: 2023-03-14 | Stop reason: HOSPADM

## 2023-03-14 RX ORDER — FENTANYL CITRATE 50 UG/ML
100 INJECTION, SOLUTION INTRAMUSCULAR; INTRAVENOUS ONCE
Status: COMPLETED | OUTPATIENT
Start: 2023-03-14 | End: 2023-03-14

## 2023-03-14 RX ORDER — PROPAFENONE HYDROCHLORIDE 150 MG/1
150 TABLET, COATED ORAL EVERY 8 HOURS
Status: CANCELLED | OUTPATIENT
Start: 2023-03-14

## 2023-03-14 RX ORDER — SODIUM CHLORIDE 9 MG/ML
INJECTION, SOLUTION INTRAVENOUS CONTINUOUS PRN
Status: DISCONTINUED | OUTPATIENT
Start: 2023-03-14 | End: 2023-03-14 | Stop reason: SDUPTHER

## 2023-03-14 RX ORDER — BISACODYL 5 MG/1
5 TABLET, DELAYED RELEASE ORAL DAILY
Status: CANCELLED | OUTPATIENT
Start: 2023-03-14

## 2023-03-14 RX ORDER — FENTANYL CITRATE 50 UG/ML
INJECTION, SOLUTION INTRAMUSCULAR; INTRAVENOUS PRN
Status: DISCONTINUED | OUTPATIENT
Start: 2023-03-14 | End: 2023-03-14 | Stop reason: SDUPTHER

## 2023-03-14 RX ORDER — SODIUM CHLORIDE 0.9 % (FLUSH) 0.9 %
5-40 SYRINGE (ML) INJECTION PRN
Status: CANCELLED | OUTPATIENT
Start: 2023-03-14

## 2023-03-14 RX ORDER — ONDANSETRON 2 MG/ML
4 INJECTION INTRAMUSCULAR; INTRAVENOUS
Status: DISCONTINUED | OUTPATIENT
Start: 2023-03-14 | End: 2023-03-14 | Stop reason: HOSPADM

## 2023-03-14 RX ORDER — HYDROMORPHONE HYDROCHLORIDE 1 MG/ML
0.5 INJECTION, SOLUTION INTRAMUSCULAR; INTRAVENOUS; SUBCUTANEOUS EVERY 5 MIN PRN
Status: DISCONTINUED | OUTPATIENT
Start: 2023-03-14 | End: 2023-03-14 | Stop reason: HOSPADM

## 2023-03-14 RX ORDER — ROPIVACAINE HYDROCHLORIDE 5 MG/ML
INJECTION, SOLUTION EPIDURAL; INFILTRATION; PERINEURAL
Status: COMPLETED
Start: 2023-03-14 | End: 2023-03-14

## 2023-03-14 RX ORDER — WATER 1000 ML/1000ML
INJECTION, SOLUTION INTRAVENOUS
Status: DISCONTINUED
Start: 2023-03-14 | End: 2023-03-14 | Stop reason: HOSPADM

## 2023-03-14 RX ORDER — FENTANYL CITRATE 50 UG/ML
INJECTION, SOLUTION INTRAMUSCULAR; INTRAVENOUS
Status: COMPLETED
Start: 2023-03-14 | End: 2023-03-14

## 2023-03-14 RX ORDER — ACETAMINOPHEN 325 MG/1
650 TABLET ORAL EVERY 6 HOURS
Status: DISCONTINUED | OUTPATIENT
Start: 2023-03-14 | End: 2023-03-14 | Stop reason: HOSPADM

## 2023-03-14 RX ORDER — CEFAZOLIN 2 G/1
INJECTION, POWDER, FOR SOLUTION INTRAMUSCULAR; INTRAVENOUS
Status: DISCONTINUED
Start: 2023-03-14 | End: 2023-03-14 | Stop reason: HOSPADM

## 2023-03-14 RX ORDER — DEXTROSE AND SODIUM CHLORIDE 5; .45 G/100ML; G/100ML
INJECTION, SOLUTION INTRAVENOUS CONTINUOUS
Status: CANCELLED | OUTPATIENT
Start: 2023-03-14

## 2023-03-14 RX ORDER — HYDROMORPHONE HYDROCHLORIDE 1 MG/ML
0.25 INJECTION, SOLUTION INTRAMUSCULAR; INTRAVENOUS; SUBCUTANEOUS EVERY 5 MIN PRN
Status: DISCONTINUED | OUTPATIENT
Start: 2023-03-14 | End: 2023-03-14 | Stop reason: HOSPADM

## 2023-03-14 RX ORDER — SODIUM CHLORIDE 9 MG/ML
INJECTION, SOLUTION INTRAVENOUS PRN
Status: CANCELLED | OUTPATIENT
Start: 2023-03-14

## 2023-03-14 RX ORDER — CELECOXIB 100 MG/1
100 CAPSULE ORAL ONCE
Status: COMPLETED | OUTPATIENT
Start: 2023-03-14 | End: 2023-03-14

## 2023-03-14 RX ORDER — SODIUM CHLORIDE, SODIUM LACTATE, POTASSIUM CHLORIDE, CALCIUM CHLORIDE 600; 310; 30; 20 MG/100ML; MG/100ML; MG/100ML; MG/100ML
INJECTION, SOLUTION INTRAVENOUS CONTINUOUS
Status: DISCONTINUED | OUTPATIENT
Start: 2023-03-14 | End: 2023-03-14 | Stop reason: HOSPADM

## 2023-03-14 RX ORDER — ACETAMINOPHEN 500 MG
1000 TABLET ORAL ONCE
Status: COMPLETED | OUTPATIENT
Start: 2023-03-14 | End: 2023-03-14

## 2023-03-14 RX ORDER — DEXAMETHASONE SODIUM PHOSPHATE 10 MG/ML
8 INJECTION INTRAMUSCULAR; INTRAVENOUS ONCE
Status: COMPLETED | OUTPATIENT
Start: 2023-03-14 | End: 2023-03-14

## 2023-03-14 RX ORDER — BUPIVACAINE HYDROCHLORIDE 7.5 MG/ML
INJECTION, SOLUTION INTRASPINAL
Status: COMPLETED | OUTPATIENT
Start: 2023-03-14 | End: 2023-03-14

## 2023-03-14 RX ORDER — MIDAZOLAM HYDROCHLORIDE 1 MG/ML
2 INJECTION INTRAMUSCULAR; INTRAVENOUS ONCE
Status: COMPLETED | OUTPATIENT
Start: 2023-03-14 | End: 2023-03-14

## 2023-03-14 RX ORDER — HYDROCODONE BITARTRATE AND ACETAMINOPHEN 5; 325 MG/1; MG/1
1 TABLET ORAL EVERY 4 HOURS PRN
Qty: 42 TABLET | Refills: 0 | Status: SHIPPED | OUTPATIENT
Start: 2023-03-14 | End: 2023-03-23 | Stop reason: SDUPTHER

## 2023-03-14 RX ORDER — ONDANSETRON 2 MG/ML
4 INJECTION INTRAMUSCULAR; INTRAVENOUS EVERY 6 HOURS PRN
Status: CANCELLED | OUTPATIENT
Start: 2023-03-14

## 2023-03-14 RX ORDER — PROPOFOL 10 MG/ML
INJECTION, EMULSION INTRAVENOUS CONTINUOUS PRN
Status: DISCONTINUED | OUTPATIENT
Start: 2023-03-14 | End: 2023-03-14 | Stop reason: SDUPTHER

## 2023-03-14 RX ORDER — OXYCODONE HYDROCHLORIDE 5 MG/1
10 TABLET ORAL EVERY 4 HOURS PRN
Status: DISCONTINUED | OUTPATIENT
Start: 2023-03-14 | End: 2023-03-14 | Stop reason: HOSPADM

## 2023-03-14 RX ORDER — FENTANYL CITRATE 50 UG/ML
INJECTION, SOLUTION INTRAMUSCULAR; INTRAVENOUS
Status: COMPLETED | OUTPATIENT
Start: 2023-03-14 | End: 2023-03-14

## 2023-03-14 RX ORDER — MELOXICAM 7.5 MG/1
3.75 TABLET ORAL DAILY
Status: DISCONTINUED | OUTPATIENT
Start: 2023-03-14 | End: 2023-03-14 | Stop reason: HOSPADM

## 2023-03-14 RX ORDER — MORPHINE SULFATE 4 MG/ML
4 INJECTION, SOLUTION INTRAMUSCULAR; INTRAVENOUS
Status: CANCELLED | OUTPATIENT
Start: 2023-03-14

## 2023-03-14 RX ORDER — ROPIVACAINE HYDROCHLORIDE 5 MG/ML
INJECTION, SOLUTION EPIDURAL; INFILTRATION; PERINEURAL
Status: COMPLETED | OUTPATIENT
Start: 2023-03-14 | End: 2023-03-14

## 2023-03-14 RX ORDER — ASPIRIN 81 MG/1
81 TABLET ORAL DAILY
Status: CANCELLED | OUTPATIENT
Start: 2023-03-14

## 2023-03-14 RX ADMIN — SODIUM CHLORIDE, POTASSIUM CHLORIDE, SODIUM LACTATE AND CALCIUM CHLORIDE: 600; 310; 30; 20 INJECTION, SOLUTION INTRAVENOUS at 07:20

## 2023-03-14 RX ADMIN — SODIUM CHLORIDE: 900 INJECTION, SOLUTION INTRAVENOUS at 08:28

## 2023-03-14 RX ADMIN — FENTANYL CITRATE 25 MCG: 50 INJECTION, SOLUTION INTRAMUSCULAR; INTRAVENOUS at 10:26

## 2023-03-14 RX ADMIN — HYDROMORPHONE HYDROCHLORIDE 0.5 MG: 1 INJECTION, SOLUTION INTRAMUSCULAR; INTRAVENOUS; SUBCUTANEOUS at 11:59

## 2023-03-14 RX ADMIN — HYDROMORPHONE HYDROCHLORIDE 0.25 MG: 1 INJECTION, SOLUTION INTRAMUSCULAR; INTRAVENOUS; SUBCUTANEOUS at 12:09

## 2023-03-14 RX ADMIN — CEFAZOLIN 2000 MG: 2 INJECTION, POWDER, FOR SOLUTION INTRAMUSCULAR; INTRAVENOUS at 08:39

## 2023-03-14 RX ADMIN — BUPIVACAINE HYDROCHLORIDE IN DEXTROSE 15 MG: 7.5 INJECTION, SOLUTION SUBARACHNOID at 08:35

## 2023-03-14 RX ADMIN — FENTANYL CITRATE 25 MCG: 50 INJECTION, SOLUTION INTRAMUSCULAR; INTRAVENOUS at 08:35

## 2023-03-14 RX ADMIN — DEXAMETHASONE SODIUM PHOSPHATE 8 MG: 10 INJECTION INTRAMUSCULAR; INTRAVENOUS at 07:21

## 2023-03-14 RX ADMIN — SODIUM CHLORIDE: 900 INJECTION, SOLUTION INTRAVENOUS at 09:11

## 2023-03-14 RX ADMIN — ACETAMINOPHEN 1000 MG: 500 TABLET, FILM COATED ORAL at 07:20

## 2023-03-14 RX ADMIN — FENTANYL CITRATE 50 MCG: 50 INJECTION, SOLUTION INTRAMUSCULAR; INTRAVENOUS at 07:48

## 2023-03-14 RX ADMIN — HYDROMORPHONE HYDROCHLORIDE 0.25 MG: 1 INJECTION, SOLUTION INTRAMUSCULAR; INTRAVENOUS; SUBCUTANEOUS at 12:19

## 2023-03-14 RX ADMIN — HYDROMORPHONE HYDROCHLORIDE 0.5 MG: 1 INJECTION, SOLUTION INTRAMUSCULAR; INTRAVENOUS; SUBCUTANEOUS at 11:49

## 2023-03-14 RX ADMIN — FENTANYL CITRATE 25 MCG: 50 INJECTION, SOLUTION INTRAMUSCULAR; INTRAVENOUS at 08:36

## 2023-03-14 RX ADMIN — ROPIVACAINE HYDROCHLORIDE 30 ML: 5 INJECTION, SOLUTION EPIDURAL; INFILTRATION; PERINEURAL at 07:51

## 2023-03-14 RX ADMIN — CEFAZOLIN 2000 MG: 2 INJECTION, POWDER, FOR SOLUTION INTRAMUSCULAR; INTRAVENOUS at 12:28

## 2023-03-14 RX ADMIN — MIDAZOLAM HYDROCHLORIDE 1 MG: 1 INJECTION INTRAMUSCULAR; INTRAVENOUS at 07:48

## 2023-03-14 RX ADMIN — PROPOFOL INJECTABLE EMULSION 75 MCG/KG/MIN: 10 INJECTION, EMULSION INTRAVENOUS at 08:41

## 2023-03-14 RX ADMIN — MELOXICAM 3.75 MG: 7.5 TABLET ORAL at 13:32

## 2023-03-14 RX ADMIN — CELECOXIB 100 MG: 100 CAPSULE ORAL at 07:20

## 2023-03-14 RX ADMIN — PHENYLEPHRINE HYDROCHLORIDE 50 MCG: 10 INJECTION INTRAVENOUS at 09:02

## 2023-03-14 RX ADMIN — MIDAZOLAM 1 MG: 1 INJECTION INTRAMUSCULAR; INTRAVENOUS at 07:48

## 2023-03-14 RX ADMIN — FENTANYL CITRATE 25 MCG: 50 INJECTION, SOLUTION INTRAMUSCULAR; INTRAVENOUS at 10:20

## 2023-03-14 ASSESSMENT — PAIN DESCRIPTION - FREQUENCY
FREQUENCY: CONTINUOUS

## 2023-03-14 ASSESSMENT — PAIN DESCRIPTION - DESCRIPTORS
DESCRIPTORS: ACHING
DESCRIPTORS: ACHING
DESCRIPTORS: ACHING;DISCOMFORT;SORE
DESCRIPTORS: SORE
DESCRIPTORS: ACHING;DISCOMFORT;SORE
DESCRIPTORS: SORE;THROBBING
DESCRIPTORS: SORE

## 2023-03-14 ASSESSMENT — PAIN DESCRIPTION - ONSET
ONSET: ON-GOING

## 2023-03-14 ASSESSMENT — PAIN SCALES - GENERAL
PAINLEVEL_OUTOF10: 3
PAINLEVEL_OUTOF10: 6
PAINLEVEL_OUTOF10: 6
PAINLEVEL_OUTOF10: 5
PAINLEVEL_OUTOF10: 8
PAINLEVEL_OUTOF10: 0
PAINLEVEL_OUTOF10: 3
PAINLEVEL_OUTOF10: 6
PAINLEVEL_OUTOF10: 7
PAINLEVEL_OUTOF10: 3
PAINLEVEL_OUTOF10: 8
PAINLEVEL_OUTOF10: 0

## 2023-03-14 ASSESSMENT — PAIN - FUNCTIONAL ASSESSMENT
PAIN_FUNCTIONAL_ASSESSMENT: PREVENTS OR INTERFERES SOME ACTIVE ACTIVITIES AND ADLS
PAIN_FUNCTIONAL_ASSESSMENT: NONE - DENIES PAIN
PAIN_FUNCTIONAL_ASSESSMENT: PREVENTS OR INTERFERES SOME ACTIVE ACTIVITIES AND ADLS
PAIN_FUNCTIONAL_ASSESSMENT: PREVENTS OR INTERFERES SOME ACTIVE ACTIVITIES AND ADLS
PAIN_FUNCTIONAL_ASSESSMENT: ACTIVITIES ARE NOT PREVENTED
PAIN_FUNCTIONAL_ASSESSMENT: PREVENTS OR INTERFERES SOME ACTIVE ACTIVITIES AND ADLS

## 2023-03-14 ASSESSMENT — PAIN DESCRIPTION - ORIENTATION
ORIENTATION: LEFT

## 2023-03-14 ASSESSMENT — PAIN DESCRIPTION - PAIN TYPE
TYPE: SURGICAL PAIN

## 2023-03-14 ASSESSMENT — PAIN DESCRIPTION - LOCATION
LOCATION: LEG;KNEE
LOCATION: KNEE
LOCATION: KNEE;LEG
LOCATION: KNEE
LOCATION: LEG

## 2023-03-14 NOTE — DISCHARGE INSTRUCTIONS
Your information:  Name: Porsha Carr  : 1945    Your instructions:  Discharge home with homecare    Orthopedics  Weight bearing Status - Weight bearing as tolerated - on left leg  Pain medication Per Prescription  Ice and Elevate effected Extremity  Continue DVT Prophylaxis-continue home aspirin and Plavix for DVT prophylaxis. Wound care - to remove dressing on postoperative day 7. Cover with a clean dry dressing as needed for drainage at that point. Follow Up in Office in 2 weeks with Dr. Paddy Jon, call the office to schedule an appointment. What to do after you leave the hospital:    Recommended diet: regular diet    Recommended activity: activity as tolerated    Signs and symptoms to watch out for:  A red, swollen, painful leg, especially in the calf area  Shortness of breath  Redness to incision  Incision hot to touch  Increased pain and feeling of tightness in upper thigh  Increase in drainage or pus from incision  Swelling that does not respond to ice and elevation  Fever above 101*     The following personal items were collected during your admission and were returned to you:    Belongings  Dental Appliances: Uppers  Vision - Corrective Lenses: None  Hearing Aid: None  Clothing: Undergarments, Footwear, Jacket/Coat, Shirt, Pants, Socks  Jewelry: None  Body Piercings Removed: N/A  Electronic Devices: None  Weapons (Notify Protective Services/Security): None  Home Medications: None  Valuables Given To: Family (Comment)  Responsible person(s) in the waiting room: julisa  Patient approves for provider to speak to responsible person post operatively: Yes    Information obtained by:  By signing below, I understand that if any problems occur once I leave the hospital I am to contact Dr. Paddy Jon. I understand and acknowledge receipt of the instructions indicated above.

## 2023-03-14 NOTE — H&P
History and Physical      CHIEF COMPLAINT: Left knee pain and limited range of motion    HISTORY OF PRESENT ILLNESS:      Aggressive worsening with swelling and crepitus    Past Medical History:        Diagnosis Date    A-fib ([facility] [address]     Arthritis     Cancer ([facility] [address]     BREAST    GERD (gastroesophageal reflux disease)     Osteoporosis     Thoracic aortic aneurysm      Past Surgical History:        Procedure Laterality Date    BREAST SURGERY      lumpectomy right breast    COLONOSCOPY      ENDOSCOPY, COLON, DIAGNOSTIC      VASCULAR SURGERY      thoracic aortic aneurysm repair     Social History:    TOBACCO:   reports that she has never smoked. She has never used smokeless tobacco.  ETOH:   reports that she does not currently use alcohol. DRUGS:   reports no history of drug use. Family History:   No family history on file. Medications Prior to Admission:  Medications Prior to Admission: propafenone (RYTHMOL) 150 MG tablet, Take 150 mg by mouth in the morning and 150 mg at noon and 150 mg in the evening. omeprazole (PRILOSEC) 20 MG delayed release capsule, Take 20 mg by mouth 2 times daily as needed  letrozole (FEMARA) 2.5 MG tablet, TAKE 1 TABLET BY MOUTH DAILY  clopidogrel (PLAVIX) 75 MG tablet, Take 75 mg by mouth daily  aspirin 81 MG EC tablet, Take 81 mg by mouth daily  Allergies:  Patient has no known allergies.     CONSTITUTIONAL:  negative for  chills and anorexia  HEENT:  negative for  tinnitus  RESPIRATORY:  negative for  dyspnea and cyanosis  CARDIOVASCULAR:  negative for  palpitations, syncope  GASTROINTESTINAL:  negative for vomiting and hematemesis  GENITOURINARY:  negative for hematuria  ENDOCRINE:  negative for tremor  MUSCULOSKELETAL: Positive for  joint swelling and decreased range of motion,  NEUROLOGICAL:  negative for seizures and syncope,    BEHAVIOR/PSYCH:  negative for agitated and anxiety    PHYSICAL EXAM:  BP (!) 159/86   Pulse 61   Temp 98 °F (36.7 °C) (Infrared)   Resp 18   SpO2 96% General appearance:  awake, alert, cooperative, no apparent distress, and appears stated age  Neurologic:  Awake, alert, oriented to name, place and time. Cranial nerves II-XII are grossly intact. Motor is 5 out of 5 bilaterally. Cerebellar finger to nose, heel to shin intact. Sensory is intact.   Babinski down going, Romberg negative, and gait is normal.  Lungs:  No increased work of breathing, good air exchange, clear to auscultation bilaterally, no crackles or wheezing  Heart:  Normal apical impulse, regular rate and rhythm, normal S1 and S2, no S3 or S4, and no murmur noted  Abdomen:  normal bowel sounds  Skin: warm and dry, no rash or erythema  ENT: tympanic membrane, external ear and ear canal normal bilaterally, oropharynx clear and moist with normal mucous membranes  Musculoskeletal: Decreased range of motion left knee with varus malalignment crepitus swelling and pain with altered gait    General Labs:  CBC:   Lab Results   Component Value Date/Time    WBC 6.0 03/09/2023 09:00 AM    RBC 4.91 03/09/2023 09:00 AM    HGB 14.3 03/09/2023 09:00 AM    HCT 46.0 03/09/2023 09:00 AM    MCV 93.7 03/09/2023 09:00 AM    RDW 14.8 03/09/2023 09:00 AM     03/09/2023 09:00 AM     CMP:    Lab Results   Component Value Date/Time     03/14/2023 07:15 AM    K 5.4 03/14/2023 07:15 AM    K 4.4 03/09/2023 09:00 AM     03/14/2023 07:15 AM    CO2 28 03/14/2023 07:15 AM    BUN 9 03/14/2023 07:15 AM    PROT 6.9 03/14/2023 07:15 AM     U/A:  No components found for: Leonid Thorn[place], USPGRAV, UPH, UPROTEIN, UGLUCOSE, UKETONE, UBILI, UBLOOD, UNITRITE, UUROBIL, [place], [facility], [place], [facility], [place], [facility], [place]    Radiology: Advanced degeneration left knee with varus wear pattern medial bone-on-bone contact    ASSESSMENT AND PLAN:    Left total knee replacement arthroplasty      Electronically signed by Tony Ozuna DO on 3/14/2023 at 8:24 AM

## 2023-03-14 NOTE — PROGRESS NOTES
[address] [facility]  [facility] Jessica Parekh. [place]        Date:3/14/2023                                                  Patient Name: Dom Schreiber    MRN: [id number]    : 1945    Room: OR POOL/NONE      Evaluating OT: Tari Stone OTR/L #[id number]     Referring Provider and Specific Provider Orders/Date:      23 114  OT eval and treat  Start:  23,   End:  23 1145,   ONE TIME,   Standing Count:  1 Occurrences,   R         JOLEEN Rodriguez,       Placement Recommendation: Home with Home Health OT        Diagnosis:   1. Primary osteoarthritis of left knee    2. Degenerative arthritis of left knee    3.  S/P total knee arthroplasty, left         Surgery:  S/p LEFT KNEE TOTAL ARTHROPLASTY 3/14/23 by Dr. Grazyna Rodriguez      Pertinent Medical History:       Past Medical History:   Diagnosis Date    A-fib [facility])     Arthritis     Cancer ([facility] [address]     BREAST    GERD (gastroesophageal reflux disease)     Osteoporosis     Thoracic aortic aneurysm          Past Surgical History:   Procedure Laterality Date    BREAST SURGERY      lumpectomy right breast    COLONOSCOPY      ENDOSCOPY, COLON, DIAGNOSTIC      VASCULAR SURGERY      thoracic aortic aneurysm repair        Precautions:  Fall Risk, full weight bearing as tolerated     Assessment of current deficits    [x] Functional mobility  [x]ADLs  [x] Strength               []Cognition    [x] Functional transfers   [x] IADLs         [x] Safety Awareness   []Endurance    [] Fine Coordination              [x] Balance      [] Vision/perception   []Sensation     []Gross Motor Coordination  [] ROM  [] Delirium                   [] Motor Control     OT PLAN OF CARE   OT POC based on physician orders, patient diagnosis and results of clinical assessment    Frequency/Duration 1-3 days/wk for 2 weeks PRN     Specific OT Treatment Interventions to include:   * Instruction/training on adapted ADL techniques and AE recommendations to increase functional independence within precautions       * Training on energy conservation strategies, correct breathing pattern and techniques to improve independence/tolerance for self-care routine  * Functional transfer/mobility training/DME recommendations for increased independence, safety, and fall prevention  * Patient/Family education to increase follow through with safety techniques and functional independence  * Recommendation of environmental modifications for increased safety with functional transfers/mobility and ADLs  * Therapeutic exercise to improve motor endurance, ROM, and functional strength for ADLs/functional transfers  * Therapeutic activities to facilitate/challenge dynamic balance, stand tolerance for increased safety and independence with ADLs    Recommended Adaptive Equipment: wheeled walker, shower chair      Home Living: Lives with 22 y/o grandson (daughter plans to stay with patient upon return home), single family home, 1st floor set-up, lift to enter home through garage. Bathroom set-up: Walk-in shower         Equipment owned: Rollator, elevated commode. Prior Level of Function: Independent with ADLs , family assist with IADLs; ambulated independently with rollator. Driving: Yes      Pain Level: 3/10 pain in L LE at rest and with activity; Nursing notified. Instructions for ice pack.       Cognition: A&O: 4/4; Follows 2-3 step directions   Memory: fair    Sequencing: fair    Problem solving: fair    Judgement/safety: fair     [facility]   AM-PAC Daily Activity - Inpatient   How much help is needed for putting on and taking off regular lower body clothing?: A Lot  How much help is needed for bathing (which includes washing, rinsing, drying)?: A Lot  How much help is needed for toileting (which includes using toilet, bedpan, or urinal)?: A Little  How much help is needed for putting on and taking off regular upper body clothing?: A Little  How much help is needed for taking care of personal grooming?: A Little  How much help for eating meals?: None  AM-PAC Inpatient Daily Activity Raw Score: 17  AM-PAC Inpatient ADL T-Scale Score : 37.26  ADL Inpatient CMS 0-100% Score: 50.11  ADL Inpatient CMS G-Code Modifier : CK     Functional Assessment:    Initial Eval Status  Date: 3/14/23   Treatment Status  Date: STGs = LTGs  Time frame: 10-14 days   Feeding Independent     Independent    Grooming Stand by Assist     Moderate [place]    UB Dressing Stand by Assist  To don overhead shirt with set-up assist    Moderate [place]    LB Dressing Moderate Assist   To thread LEs through pants while seated and don over hips upon standing supported at walker. Moderate [place]    Bathing Moderate Assist     Moderate [place]    Toileting Minimal Assist   To maintain balance while standing for perineal hygiene. Moderate [place]    Bed Mobility  Supine to sit: Minimal Assist , Moderate Assist to scoot to EOB  Sit to supine: Minimal Assist     Supine to sit: Independent   Sit to supine: Independent    Functional Transfers Sit to stand: Moderate Assist, progressing to Minimal Assist   Stand to sit: Minimal Assist     Transfer training with verbal cues for hand placement throughout session to improve safety. Independent   Functional Mobility Minimal Assist with wheeled walker to improve balance short distances in room, verbal cues for walker sequence, joint protection, and safety.      Moderate [place] with use of wheeled walker    Balance Sitting:     Static: fair plus     Dynamic: fair plus   Standing: fair with walker    Sitting:     Static:  good    Dynamic: good  Standing: good with walker    Activity Tolerance fair  plus  Increase standing tolerance >3 minutes for improved engagement with functional transfers and indep in ADLs     Visual/  Perceptual WFL      NA      Hand Dominance:      AROM (PROM) Strength Additional Info:    RUE  WFL 4-/5 good  and wfl FMC/dexterity noted during ADL tasks     LUE WFL 4-/5 good  and wfl FMC/dexterity noted during ADL tasks       Hearing:  WFL   Sensation:   No c/o numbness or tingling  Tone:  WFL   Edema: None     Comments: RN cleared patient for OT. Upon arrival patient in supine, daughter at bedside. Therapist facilitated and instructed pt on adapted  techniques & compensatory strategies to improve safety and independence with basic ADLs, bed mobility, functional transfers and mobility to allow pt to achieve highest level of independence and safely. Pt demonstrated fair plus understanding of education & follow through. At end of session, patient was supine with call light and phone within reach, all lines and tubes intact. Overall, patient demonstrated  decreased independence and safety during completion of ADL tasks. Pt would benefit from continued skilled OT to increase safety and independence with completion of ADL tasks and functional mobility for improved quality of life. Treatment: OT treatment provided this date includes:   Instruction, education and training on safe facilitation and adapted techniques for completion of ADLs. These include safe functional transfer techniques and energy conservation for completion of ADLs. Education provided on joint protection, hand/feet placement with walker and body mechanics for fall prevention. Cues for energy conservation and safety for in the home at [place], including modifications and DME. Extended time to complete all tasks, including skilled monitoring of patient's response during treatment session and vital signs. Prior to and at the end of session, environmental modifications / line management completed for patients safety and efficiency of treatment session. See above for further details. Rehab Potential: Good for established goals.       Patient / Family Goal: Not stated       Patient and/or family were instructed on functional diagnosis, prognosis/goals and OT plan of care. Demonstrated fair plus understanding. Eval Complexity: Low    Time In: 1:35 PM   Time Out: 2:15 PM    Total Treatment Time: 25       Min Units   OT Eval Low 97165  X  1    OT Eval Medium 27334      OT Eval High 06388      OT Re-Eval 81672            ADL/Self Care 18636 15 1   Therapeutic Activities 96911 10 1   Therapeutic Ex 14771       Orthotic Management 93073       Manual 45635     Neuro Re-Ed 63645       Non-Billable Time        Evaluation Time additionally includes thorough review of current medical information, gathering information on past medical history/social history and prior level of function, interpretation of standardized testing/informal observation of tasks, assessment of data and development of plan of care and goals.         Evaluating OT: Corena Goodell OTR/L #[id number]

## 2023-03-14 NOTE — OP NOTE
Operative Note      Patient: Jacy Gregory  YOB: 1945  MRN: [id number]    Date of Procedure: 3/14/2023    Pre-Op Diagnosis: Degenerative arthritis of left knee [M17.12]    Post-Op Diagnosis: Same       Procedure(s):  LEFT KNEE TOTAL ARTHROPLASTY    Surgeon(s):  JOLEEN Shepard DO    Assistant:   Resident: Madiha Carranza DO; Blayne Pope DO; Ajith Clancy DO    Anesthesia: Spinal    Estimated Blood Loss (mL): Minimal    Complications: None    Specimens:   ID Type Source Tests Collected by Time Destination   A : bone left knee Bone Bone SURGICAL PATHOLOGY JOLEEN Shepard DO 3/14/2023 1103        Implants:  Implant Name Type Inv.  Item Serial No.  Lot No. LRB No. Used Action   INSERT TIB PS 4 13 MM KNEE 5/PK X3 TRIATHLON - [id number]  INSERT TIB PS 4 13 MM KNEE 5/PK X3 TRIATHLON  [employer] [id number] Left 1 Implanted   IMPLANT PATELLAR [id number] THK8MM X3 SYMMETRIC TRIATHLON - [id number]  IMPLANT PATELLAR [id number] THK8MM X3 SYMMETRIC TRIATHLON  [employer] [id number] Left 1 Implanted   COMPONENT FEM SZ 5 L KNEE POST STBL THI TRIATHLON - [id number]  COMPONENT FEM SZ 5 L KNEE POST STBL THI TRIATHLON  [employer] [id number] Left 1 Implanted   BASEPLATE TIB SZ 4 UNIV KNEE TRITANIUM TOT STBL THI - [id number]  BASEPLATE TIB SZ 4 UNIV KNEE TRITANIUM TOT STBL THI  [employer] [id number] Left 1 Implanted   STEM TIB L50MM [id number] KNEE TOT STBL THI END CAP TRIATHLON - [id number]  STEM TIB L50MM [id number] KNEE TOT STBL THI END CAP TRIATHLON  [employer] [id number] Left 1 Implanted   CEMENT BNE 20ML 40GM FULL DOSE PMMA W/O ANTIBIO M VISC - [id number]  CEMENT BNE 20ML 40GM FULL DOSE PMMA W/O ANTIBIO M VISC  [employer] [id number] Left 1 Implanted         Drains: * No LDAs found *    Findings: Advanced degeneration left knee with varus malalignment with medial bony sclerosis and total loss of articular cartilage with hypertrophic marginal changes and capsular contracture. Detailed Description of Procedure: The patient is brought to operating room after site side identified. Adductor canal block of been done by anesthesia hold area. A spinal anesthetic was administered by anesthesia in the operating room. Patient was placed supine. A tourniquet was placed on the left upper thigh and then the leg was prepped and draped in sterile fashion with the left lower extremity free. A straight anterior midline incision was marked and the leg was exsanguinated with elastic wrap tourniquet pressure 300 mmHg. Full-thickness medial lateral flaps were elevated. A median parapatellar capsulotomy was performed. Portion of the fat pad and anterior horns of the menisci removed. Portion of the fat above the trochlea was removed as well. The patella was exposed and cleared of soft tissues and then measured and cut for appropriate thickness replacement and protective metal disc was applied to this. The femur was then further exposed and an IM drill hole was made in the distal femur for stabilization of the distal cutting block set at 4 degrees of valgus and 8 mm resection. Patient had no significant contractures prior to this and this was established standard. The bone cut was then made and the bone wafer removed. The femur was then measured for size and a 5 gave his best fit without notching. The 4-in-1 cutting block was then placed in appropriate rotation and the anterior posterior and chamfer cuts were made. Osteophytes were further resected at this time smoothing of the margins of the femoral condyles. The tibia was retracted forward and the meniscal remnants were removed. The cruciate ligaments were sacrificed at this point. The tibia was set up with IM stabilization of the proximal cutting block set roughly perpendicular to the long axis of the tibia.   The medial condyle which was more worn was referenced and the bone cut was established and the spacer blocks were used prior to cutting the bone to assess the medial and lateral gaps in flexion and extension balance. The bone cut was made. The tensiometer was then used to check the flexion extension gaps and medial and lateral balance. She was little tight on the medial side. Further soft tissue release and medial osteophyte resection from the tibia helped with this. Full extension was achieved in flexion to 110 degrees or more limited by body habitus only. The tibial baseplate size 4 gave best coverage without overhang and was placed in appropriate rotation and then pinned to the tibia. The 11 mm bearing was placed and the femoral trial component and the knee taken through range of motion. She was still lax in the lateral side and the flexion and extension gaps were little bit loose. The 13 mm bearing was placed and this improved overall stability greatly flexion and extension and also medial and lateral.  This was chosen as the final construct. The tibia was then prepared for the cemented stem in this lady with osteopenic bone the fin broach was also used to prepare the proximal tibia. The box jig was then placed on the femur for the posterior stabilized femoral component. This was cut and the bone removed. The patella was then cared for the 29 mm symmetric poly patella to avoid overhang and to allow good positioning. The trials were removed. The knee was thoroughly irrigated with pulse lavage. A bone plug was placed in the distal femoral canal.  The bone surfaces were dried. The cement was mixed in my viscus and all components were cemented. Excess cement was removed during initial impaction and after partial set up. The patella was held with self-retaining clamp as the cement set up and the tibial bearing was snapped securely in place. Shock solution was used to irrigate the knee for the appropriate time then irrigated clear with saline.   Vancomycin powder was then placed in the joint and the capsule closed with 0 Vicryl figure-of-eight sutures. Patellofemoral alignment and tension were good. Ortho mix injection was placed in the knee joint. The subcu was then irrigated and remaining vancomycin powder placed here in the layer closed with 2-0 Vicryl sutures. Stainless steel staples secured the skin. Aquacel Ag and a bulky Guevara type dressing were applied and. Tourniquet was deflated and good circulation turned to the lower extremity. Patient was then taken recovery in stable condition. All reasonable efforts have been made to minimize the risk of errors that may occur in the use of voice recognition and other electronic means of charting.       Electronically signed by Cullen Coyle DO on 3/14/2023 at 11:13 AM

## 2023-03-14 NOTE — PROGRESS NOTES
Physical Therapy Initial Evaluation/Plan of Care    Room #:  OR POOL/NONE  Patient Name: Chris Ngo  YOB: 1945  MRN: [id number]    Date of Service: 3/14/2023     Tentative placement recommendation: Home with Home Health Physical Therapy 5 days/week   Equipment recommendation: Celia Jensen and Shower chair      Evaluating Physical Therapist: Tran Crain #[id number]     Specific Provider Orders/Date/Referring Provider :  03/14/23 1145    PT eval and treat  Start:  03/14/23 1145,   End:  03/14/23 1145,   ONE TIME,   Standing Count:  1 Occurrences,   R         K Sepideh Serve, DO     Admitting Diagnosis:   Degenerative arthritis of left knee [M17.12]   Visit diagnosis:   Visit Diagnoses         Codes    S/P total knee arthroplasty, left     M96.453            Patient Active Problem List   Diagnosis    Degenerative arthritis of left knee        ASSESSMENT of Current Deficits  Safe for discharge home with family at a Supervision  level. Patient will continue to need therapy to maximize function. All questions and concerns addressed. PHYSICAL THERAPY  PLAN OF CARE       Patient and or family understand(s) diagnosis, prognosis, and plan of care.        Prior Level of Function: Patient ambulated independently    Rehab Potential: good for baseline      Past medical history:   Past Medical History:   Diagnosis Date    A-fib ([facility] [address]     Arthritis     Cancer ([facility] [address]     BREAST    GERD (gastroesophageal reflux disease)     Osteoporosis     Thoracic aortic aneurysm      Past Surgical History:   Procedure Laterality Date    BREAST SURGERY      lumpectomy right breast    COLONOSCOPY      ENDOSCOPY, COLON, DIAGNOSTIC      VASCULAR SURGERY      thoracic aortic aneurysm repair         SUBJECTIVE:    Precautions:  ambulate patient, falls ,left lower extremity FWB (full weight bearing)      Social history: Patient lives with grandson in a two story home resides first  with  stair lift from the garage to get into the house  Walk in shower  , grab bars     Equipment owned: Rollator and Elevated toilet seat,       AM-PAC Basic Mobility        AM-PAC Basic Mobility - Inpatient   How much help is needed turning from your back to your side while in a flat bed without using bedrails?: A Little  How much help is needed moving from lying on your back to sitting on the side of a flat bed without using bedrails?: A Little  How much help is needed moving to and from a bed to a chair?: A Little  How much help is needed standing up from a chair using your arms?: A Little  How much help is needed walking in hospital room?: A Little  How much help is needed climbing 3-5 steps with a railing?: A Little  AM-[facility] Inpatient Mobility Raw Score : 18  AM-PAC Inpatient T-Scale Score : 43.63  Mobility Inpatient CMS 0-100% Score: 46.58  Mobility Inpatient CMS G-Code Modifier : CK    Nursing cleared patient for PT evaluation. The admitting diagnosis and active problem list as listed above have been reviewed prior to the initiation of this evaluation. OBJECTIVE:   Initial Evaluation  Date: 3/14/2023   Was pt agreeable to Eval/treatment?  Yes   Pain Level  5/10   Left knee    Bed Mobility  Rolling: Supervision     Supine to sit: Supervision     Sit to supine: Supervision     Scooting: Supervision      Transfers Sit to stand: Supervision  Cues for hand placement and safety    Ambulation     2 x 70 feet using  wheeled walker with Minimal progressing to supervision   for walker control and balance and cues for sequencing, left knee extension in stance phase of gait, and safety   Stair negotiation: ascended and descended   Not assessed      ROM Within functional limits except Left knee 0-90°    Strength Within functional limits  except  Left lower extremity 3/5   Balance Sitting EOB:  good    Dynamic Standing:  good - wheeled walker      Patient is Alert & Oriented x person, place, time, and situation and follows directions    Sensation: Patient reports numbness/tingling bilateral lower extremities  slight   Edema:  yes left lower extremity   Vitals:  Blood Pressure at rest   Blood Pressure during session     Heart Rate at rest   Heart Rate during session     SPO2 at rest  %  SPO2 during session  %     Patient education  Patient educated on role of Physical Therapy, risks of immobility, safety and plan of care,  importance of mobility while in hospital , ankle pumps, quad set and glut set for edema control, blood clot prevention, weight bearing status , and left knee extension in bed and during stance phase of gait      Patient response to education:   Pt verbalized understanding Pt demonstrated skill Pt requires further education in this area   Yes Partial Yes       Treatment:  Patient practiced and was instructed in the following treatment:     Therapist educated and facilitated patient on techniques to increase safety and independence with bed mobility, balance, functional transfers, and functional mobility. Patient performed ankle pumps, quad sets, glut sets x 20 each. Active assist heelslide, hip abduction/adduction, straight leg raise x 20 each  Assisted to edge of bed,  Sat edge of bed 10 minutes with Supervision  to increase dynamic sitting balance and activity tolerance. . Ambulated ambulated to/from bathroom, transferred on/off commode, stood at sink with supervision for balance to wash hands   Returned to room, assisted back to bed. Instruction knee extension in bed with kneecap and toes pointing to ceiling    At end of session, patient in bed with daughter present call light and phone within reach,   all lines and tubes intact, nursing notified. Patient would benefit from skilled Home Physical Therapy to improve functional independence and quality of life. Patient's/ family goals   home today    Patient and or family understand(s) diagnosis, prognosis, and plan of care.        Time in  1435  Time out  1525    Total Treatment Time  30 minutes    Evaluation time includes thorough review of current medical information, gathering information on past medical history/social history and prior level of function, completion of standardized testing/informal observation of tasks, assessment of data, and development of Plan of care and goals.      CPT codes:  Low Complexity PT evaluation (75510)  Therapeutic activities (28714)   15 minutes  1 unit(s)  Therapeutic exercises (57861)   10 minutes  1 unit(s)  Non billable time 5 minutes    Abena Carrasquillo, PT

## 2023-03-14 NOTE — CARE COORDINATION
Orders for [address] and DME orders noted for patient. Call placed to [place] at [facility] and Zulma Levi at [facility], they both are aware of orders. DME to be delivered to [facility] and [facility] will start tomorrow.

## 2023-03-14 NOTE — PROGRESS NOTES
Patient has home dose of  Ryhtmol 150 mg she takes at morning noon and evening . Patient would like to take her noon dose. Per Dr. Dania Ruffin she can take her home dose.

## 2023-03-14 NOTE — ANESTHESIA PROCEDURE NOTES
Spinal Block    Patient location during procedure: OR  End time: 3/14/2023 8:40 AM  Reason for block: primary anesthetic and at surgeon's request  Staffing  Performed: other anesthesia staff   Anesthesiologist: Heike Green MD  Resident/CRNA: Beth Muhammad, [address] [facility] [place] [facility]  Other anesthesia staff: Sivan Keating RN  Spinal Block  Patient position: sitting  Prep: Betadine and site prepped and draped  Patient monitoring: cardiac monitor, continuous pulse ox, continuous capnometry and frequent blood pressure checks  Approach: midline  Location: L3/L4  Provider prep: mask, sterile gloves and sterile gown  Needle  Needle type: Pencan   Needle gauge: 25 G  Needle length: 3.5 in  Assessment  Sensory level: T6  Swirl obtained: Yes  CSF: clear  Attempts: 1  Hemodynamics: stable  Preanesthetic Checklist  Completed: patient identified, IV checked, site marked, risks and benefits discussed, surgical/procedural consents, equipment checked, pre-op evaluation, timeout performed, anesthesia consent given, oxygen available, monitors applied/VS acknowledged, fire risk safety assessment completed and verbalized and blood product R/B/A discussed and consented

## 2023-03-14 NOTE — ANESTHESIA POSTPROCEDURE EVALUATION
Department of Anesthesiology  Postprocedure Note    Patient: Ladi Bess  MRN: [id number]  YOB: 1945  Date of evaluation: 3/14/2023      Procedure Summary     Date: 03/14/23 Room / Location: [address] [place] [address] / [address] [place] [address]    Anesthesia Start: 0409 Anesthesia Stop: 1134    Procedure: LEFT KNEE TOTAL ARTHROPLASTY (Left: Knee) Diagnosis:       Degenerative arthritis of left knee      (Degenerative arthritis of left knee [M17.12])    Surgeons: Kenny Welch DO Responsible Provider: Flavia Zhang MD    Anesthesia Type: general, spinal ASA Status: 3          Anesthesia Type: No value filed.     Yossi Phase I: Ysosi Score: 10    Yossi Phase II:        Anesthesia Post Evaluation    Patient location during evaluation: PACU  Patient participation: complete - patient participated  Level of consciousness: awake  Pain score: 3  Airway patency: patent  Nausea & Vomiting: no nausea  Complications: no  Cardiovascular status: hemodynamically stable  Respiratory status: acceptable  Hydration status: stable  Multimodal analgesia pain management approach

## 2023-03-14 NOTE — PROGRESS NOTES
0555 the patient was brought to the PACU department and placed on the cardiac monitor and 3 liters of oxygen. Vital signs taken /95, Pulse 64, SpO2 93% on RA.  0747 Timeout completed for a left sided adductor canal block. 0753 The patient was premedicated as seen in the [facility]   2464-8458 A left sided adductor canal block was preformed by Dr. Leonard Gonzalez. The ultrasound was utilized. No complications noted. 0755 vital signs /83, Pulse 61, SpO2 94% on 4 liters.

## 2023-03-14 NOTE — ANESTHESIA PROCEDURE NOTES
Peripheral Block    Patient location during procedure: procedure area  Reason for block: post-op pain management and at surgeon's request  Start time: 3/14/2023 7:51 AM  End time: 3/14/2023 7:54 AM  Staffing  Performed: anesthesiologist   Anesthesiologist: Keysha Potts MD  Preanesthetic Checklist  Completed: patient identified, IV checked, site marked, risks and benefits discussed, surgical/procedural consents, equipment checked, pre-op evaluation, timeout performed, anesthesia consent given, oxygen available and monitors applied/VS acknowledged  Peripheral Block   Patient position: supine  Prep: alcohol swabs  Provider prep: mask and sterile gloves  Patient monitoring: cardiac monitor, continuous pulse ox, frequent blood pressure checks and IV access  Block type: Femoral  Adductor canal  Laterality: left  Injection technique: single-shot  Guidance: ultrasound guided  Local infiltration: lidocaine  Infiltration strength: 1 %  Local infiltration: lidocaine  Dose: 2 mL    Needle   Needle type: short-bevel   Needle gauge: 22 G  Needle localization: ultrasound guidance  Needle length: 8 cm  Assessment   Injection assessment: negative aspiration for heme, no paresthesia on injection and local visualized surrounding nerve on ultrasound  Paresthesia pain: none  Slow fractionated injection: yes  Hemodynamics: stable  Real-time US image taken/store: yes  Outcomes: uncomplicated    Additional Notes  Mixture of 0.5% Ropivacaine 30 mL injected in incremental doses of 5 mL after negative blood aspiration.  Medications Administered  ropivacaine (NAROPIN) injection 0.5% - Perineural   30 mL - 3/14/2023 7:51:00 AM

## 2023-03-22 ENCOUNTER — TELEPHONE (OUTPATIENT)
Dept: ORTHOPEDIC SURGERY | Age: 78
End: 2023-03-22

## 2023-03-22 DIAGNOSIS — Z96.652 S/P TOTAL KNEE ARTHROPLASTY, LEFT: ICD-10-CM

## 2023-03-22 NOTE — TELEPHONE ENCOUNTER
Patient.     .Last appointment Visit date not found  Next appointment   Future Appointments   Date Time Provider Saira Jane   4/4/2023  9:30 AM DO Aguila Pina [facility]      Last refill:  03/14/2023  DOS: 03/14/2023      Patient called in requesting refill of:    HYDROcodone-acetaminophen (Sherita Huguenin) 5-325 MG per tablet     [address], [place] - [address]

## 2023-03-23 RX ORDER — HYDROCODONE BITARTRATE AND ACETAMINOPHEN 5; 325 MG/1; MG/1
1 TABLET ORAL EVERY 6 HOURS PRN
Qty: 28 TABLET | Refills: 0 | Status: SHIPPED | OUTPATIENT
Start: 2023-03-23 | End: 2023-03-30

## 2023-03-30 DIAGNOSIS — Z96.652 S/P TOTAL KNEE ARTHROPLASTY, LEFT: Primary | ICD-10-CM

## 2023-04-04 ENCOUNTER — OFFICE VISIT (OUTPATIENT)
Dept: ORTHOPEDIC SURGERY | Age: 78
End: 2023-04-04

## 2023-04-04 VITALS — WEIGHT: 200 LBS | HEIGHT: 66 IN | BODY MASS INDEX: 32.14 KG/M2

## 2023-04-04 DIAGNOSIS — Z96.652 S/P TOTAL KNEE ARTHROPLASTY, LEFT: Primary | ICD-10-CM

## 2023-04-04 PROCEDURE — 99024 POSTOP FOLLOW-UP VISIT: CPT | Performed by: ORTHOPAEDIC SURGERY

## 2023-04-04 NOTE — PROGRESS NOTES
Post-Operative week: 3 Status Post left Total Knee Arthroplasty, surgery date 03/14/23  Systemic or Specific Complaints:No Complaints    Objective:     General: alert, appears stated age, and cooperative   Wound: Wound clean and dry no evidence of infection. , No Erythema, and No Edema   Motion: Flexion: 0 to 110 Degrees   DVT Exam: No evidence of DVT seen on physical exam.  Negative Bernie's sign. No cords or calf tenderness. Xrays:  No signs of fracture, there is good alignment, and no signs of aseptic loosening. Radiographic findings reviewed with patient    Assessment:     Encounter Diagnosis   Name Primary? S/P total knee arthroplasty, left Yes      Doing well postoperatively. Plan:     Continues current post-op course  Continues current post-op course, staples were removed at today's appt  Patient is to continue taking enteric coated aspirin 81 mg, 1 tablet twice daily. Patient is to continue wearing JEMMA hose until next follow up visit but wear during the day and remove at night. Outpatient physical therapy is to begin immediately. No bathing/swimming/hot tub for two weeks or until incision is completely closed.

## 2023-04-05 ENCOUNTER — TELEPHONE (OUTPATIENT)
Dept: ORTHOPEDIC SURGERY | Age: 78
End: 2023-04-05

## 2023-04-05 DIAGNOSIS — Z96.652 S/P TOTAL KNEE ARTHROPLASTY, LEFT: ICD-10-CM

## 2023-04-05 RX ORDER — HYDROCODONE BITARTRATE AND ACETAMINOPHEN 5; 325 MG/1; MG/1
1 TABLET ORAL EVERY 6 HOURS PRN
Qty: 28 TABLET | Refills: 0 | Status: SHIPPED | OUTPATIENT
Start: 2023-04-05 | End: 2023-04-12

## 2023-04-05 NOTE — TELEPHONE ENCOUNTER
patient    Last appointment 4/4/2023  Next appointment   Future Appointments   Date Time Provider Saira Jane   5/22/2023  8:15 AM DO Violetta [facility]      Last refill:  03/23/2023  DOS: 03/14/2023      Patient called in requesting refill of:    HYDROcodone-acetaminophen ([facility]) 5-325 MG per tablet       [facility] [address], [address] [place] [id number]

## 2023-05-22 ENCOUNTER — OFFICE VISIT (OUTPATIENT)
Dept: ORTHOPEDIC SURGERY | Age: 78
End: 2023-05-22

## 2023-05-22 VITALS — BODY MASS INDEX: 32.14 KG/M2 | HEIGHT: 66 IN | WEIGHT: 200 LBS | TEMPERATURE: 98 F

## 2023-05-22 DIAGNOSIS — Z96.652 S/P TOTAL KNEE ARTHROPLASTY, LEFT: Primary | ICD-10-CM

## 2023-05-22 PROCEDURE — 99024 POSTOP FOLLOW-UP VISIT: CPT | Performed by: ORTHOPAEDIC SURGERY

## 2023-05-22 NOTE — PROGRESS NOTES
Chief Complaint:   Chief Complaint   Patient presents with    Knee Pain     Left knee TKA doing great. DOS 3/14/23. Oneyda Gallegos is about 2-1/2 months postop left total knee replacement arthroplasty. She is doing very well. She is exercising herself at home. She uses a cane when she is out and about walking. She has been on a cruise since she had her surgery and did fine. She actually rode the exercise bike a little bit on the ship. Allergies; medications; past medical, surgical, family, and social history; and problem list have been reviewed today and updated as indicated in this encounter seen below. Exam: The incision is healing well. Alignment is good. She has a smooth gait and slow pace. Range of motion of the knee is 0 to 110 degrees or more flexion. She has stable collaterals and good anterior posterior dynamic stability. Radiographs: None    ASSESSMENT:    Kelly Elena was seen today for knee pain. Diagnoses and all orders for this visit:    S/P total knee arthroplasty, left        PLAN: Continue with her exercise. She does not want to go to outpatient therapy but prefers to continue herself. We will follow-up an x-ray at her next visit. Return in about 14 weeks (around 8/28/2023). Current Outpatient Medications   Medication Sig Dispense Refill    propafenone (RYTHMOL) 150 MG tablet Take 1 tablet by mouth in the morning and 1 tablet at noon and 1 tablet in the evening. omeprazole (PRILOSEC) 20 MG delayed release capsule Take 1 capsule by mouth 2 times daily as needed      letrozole (FEMARA) 2.5 MG tablet TAKE 1 TABLET BY MOUTH DAILY      clopidogrel (PLAVIX) 75 MG tablet Take 1 tablet by mouth daily      aspirin 81 MG EC tablet Take 1 tablet by mouth daily       No current facility-administered medications for this visit.        Patient Active Problem List   Diagnosis    Degenerative arthritis of left knee       Past Medical History:   Diagnosis Date    A-fib ([facility] [address]

## 2023-08-09 ENCOUNTER — HOSPITAL ENCOUNTER (OUTPATIENT)
Dept: AUDIOLOGY | Age: 78
Discharge: HOME | End: 2023-08-09
Payer: MEDICARE

## 2023-08-09 PROCEDURE — 92593 HC HEARING AID CHECK, BOTH EARS: CPT | Performed by: AUDIOLOGIST

## 2023-08-09 NOTE — PROGRESS NOTES
The patient came in for a hearing aid check. Her left hearing aid gain was increased. Right hearing aid could not be adjusted as it was too old for software, but was cleaned and checked. Domes and wax guards changed, bilaterally. Patient was satisfied and will return as needed.      Electronically signed by Daphne Bonilla on 8/9/2023 at 4:58 PM

## 2023-09-01 DIAGNOSIS — Z96.652 S/P TOTAL KNEE ARTHROPLASTY, LEFT: Primary | ICD-10-CM

## 2023-09-06 ENCOUNTER — OFFICE VISIT (OUTPATIENT)
Dept: ORTHOPEDIC SURGERY | Age: 78
End: 2023-09-06

## 2023-09-06 VITALS — TEMPERATURE: 98.5 F | BODY MASS INDEX: 32.28 KG/M2 | HEIGHT: 66 IN

## 2023-09-06 DIAGNOSIS — Z96.652 S/P TOTAL KNEE ARTHROPLASTY, LEFT: Primary | ICD-10-CM

## 2023-09-06 NOTE — PROGRESS NOTES
this visit. Patient Active Problem List   Diagnosis    Degenerative arthritis of left knee       Past Medical History:   Diagnosis Date    A-fib ([address])     Arthritis     Cancer ([address])     BREAST    GERD (gastroesophageal reflux disease)     Osteoporosis     Thoracic aortic aneurysm [facility])        Past Surgical History:   Procedure Laterality Date    BREAST SURGERY      lumpectomy right breast    COLONOSCOPY      ENDOSCOPY, COLON, DIAGNOSTIC      TOTAL KNEE ARTHROPLASTY Left 3/14/2023    LEFT KNEE TOTAL ARTHROPLASTY performed by Diane Sanford DO at [address]      thoracic aortic aneurysm repair       No Known Allergies    Social History     Socioeconomic History    Marital status:      Spouse name: None    Number of children: None    Years of education: None    Highest education level: None   Tobacco Use    Smoking status: Never    Smokeless tobacco: Never   Vaping Use    Vaping Use: Never used   Substance and Sexual Activity    Alcohol use: Not Currently    Drug use: Never    Sexual activity: Defer       Review of Systems  As follows except as previously noted in HPI:  Constitutional: Negative for chills, diaphoresis, fatigue, fever and unexpected weight change. Respiratory: Negative for cough, shortness of breath and wheezing. Cardiovascular: Negative for chest pain and palpitations. Neurological: Negative for dizziness, syncope, cephalgia. GI / : negative  Musculoskeletal: see HPI       Objective:   Physical Exam   Constitutional: Oriented to person, place, and time. and appears well-developed and well-nourished. :   Head: Normocephalic and atraumatic. Eyes: EOM are normal.   Neck: Neck supple. Cardiovascular: Normal rate and regular rhythm. Pulmonary/Chest: Effort normal. No stridor. No respiratory distress, no wheezes. Abdominal:  No abnormal distension. Neurological: Alert and oriented to person, place, and time. Skin: Skin is warm and dry.    Psychiatric:

## 2024-02-07 ENCOUNTER — HOSPITAL ENCOUNTER (OUTPATIENT)
Dept: AUDIOLOGY | Age: 79
Discharge: HOME | End: 2024-02-07
Payer: MEDICARE

## 2024-02-07 PROCEDURE — 92592 HC HEARING AID CHECK, ONE EAR: CPT | Performed by: AUDIOLOGIST

## 2024-02-07 NOTE — PROGRESS NOTES
The patient came in for a hearing aid check. Her left hearing aid gain was increased to aid in speech understanding. Wax guards changed, and a new pack of wax guards were given to the patient. Possible new hearing aids were discussed, however the patient has paulina waldrop. She will follow up with these hearing aids as needed here and if she wants to pursue new ones she will likely go where she has hearing aid coverage. No other issues noted today.    Electronically signed by Daphne Omalley on 2/7/2024 at 5:21 PM

## 2024-02-26 ENCOUNTER — APPOINTMENT (OUTPATIENT)
Dept: GENERAL RADIOLOGY | Age: 79
DRG: 329 | End: 2024-02-26
Payer: MEDICARE

## 2024-02-26 ENCOUNTER — ANESTHESIA EVENT (OUTPATIENT)
Dept: OPERATING ROOM | Age: 79
End: 2024-02-26
Payer: MEDICARE

## 2024-02-26 ENCOUNTER — HOSPITAL ENCOUNTER (INPATIENT)
Age: 79
LOS: 9 days | Discharge: HOME HEALTH | DRG: 329 | End: 2024-03-06
Attending: STUDENT IN AN ORGANIZED HEALTH CARE EDUCATION/TRAINING PROGRAM | Admitting: SURGERY
Payer: MEDICARE

## 2024-02-26 ENCOUNTER — APPOINTMENT (OUTPATIENT)
Dept: CT IMAGING | Age: 79
DRG: 329 | End: 2024-02-26
Payer: MEDICARE

## 2024-02-26 ENCOUNTER — ANESTHESIA (OUTPATIENT)
Dept: OPERATING ROOM | Age: 79
End: 2024-02-26
Payer: MEDICARE

## 2024-02-26 DIAGNOSIS — I48.91 ATRIAL FIBRILLATION WITH RVR (HCC): Primary | ICD-10-CM

## 2024-02-26 DIAGNOSIS — K92.2 GASTROINTESTINAL HEMORRHAGE, UNSPECIFIED GASTROINTESTINAL HEMORRHAGE TYPE: ICD-10-CM

## 2024-02-26 DIAGNOSIS — K56.609 COLON OBSTRUCTION (HCC): ICD-10-CM

## 2024-02-26 DIAGNOSIS — L72.3 SEBACEOUS CYST OF LEFT AXILLA: ICD-10-CM

## 2024-02-26 PROBLEM — K57.80 PERFORATED DIVERTICULUM: Status: ACTIVE | Noted: 2024-02-26

## 2024-02-26 LAB
ALBUMIN SERPL-MCNC: 3.9 G/DL (ref 3.5–5.2)
ALP SERPL-CCNC: 84 U/L (ref 35–104)
ALT SERPL-CCNC: 8 U/L (ref 0–32)
ANION GAP SERPL CALCULATED.3IONS-SCNC: 11 MMOL/L (ref 7–16)
AST SERPL-CCNC: 13 U/L (ref 0–31)
BASOPHILS # BLD: 0.03 K/UL (ref 0–0.2)
BASOPHILS NFR BLD: 0 % (ref 0–2)
BILIRUB SERPL-MCNC: 0.4 MG/DL (ref 0–1.2)
BUN SERPL-MCNC: 9 MG/DL (ref 6–23)
CALCIUM SERPL-MCNC: 9.2 MG/DL (ref 8.6–10.2)
CHLORIDE SERPL-SCNC: 103 MMOL/L (ref 98–107)
CO2 SERPL-SCNC: 26 MMOL/L (ref 22–29)
CREAT SERPL-MCNC: 0.7 MG/DL (ref 0.5–1)
EOSINOPHIL # BLD: 0.16 K/UL (ref 0.05–0.5)
EOSINOPHILS RELATIVE PERCENT: 2 % (ref 0–6)
ERYTHROCYTE [DISTWIDTH] IN BLOOD BY AUTOMATED COUNT: 14.8 % (ref 11.5–15)
GFR SERPL CREATININE-BSD FRML MDRD: >60 ML/MIN/1.73M2
GLUCOSE SERPL-MCNC: 92 MG/DL (ref 74–99)
HCT VFR BLD AUTO: 32.6 % (ref 34–48)
HCT VFR BLD AUTO: 34.3 % (ref 34–48)
HCT VFR BLD AUTO: 41.1 % (ref 34–48)
HCT VFR BLD AUTO: 42.9 % (ref 34–48)
HGB BLD-MCNC: 10.6 G/DL (ref 11.5–15.5)
HGB BLD-MCNC: 11.1 G/DL (ref 11.5–15.5)
HGB BLD-MCNC: 13.6 G/DL (ref 11.5–15.5)
HGB BLD-MCNC: 13.9 G/DL (ref 11.5–15.5)
IMM GRANULOCYTES # BLD AUTO: <0.03 K/UL (ref 0–0.58)
IMM GRANULOCYTES NFR BLD: 0 % (ref 0–5)
INR PPP: 1.1
LACTATE BLDV-SCNC: 1.7 MMOL/L (ref 0.5–1.9)
LIPASE SERPL-CCNC: 23 U/L (ref 13–60)
LYMPHOCYTES NFR BLD: 1.47 K/UL (ref 1.5–4)
LYMPHOCYTES RELATIVE PERCENT: 21 % (ref 20–42)
MCH RBC QN AUTO: 30.2 PG (ref 26–35)
MCHC RBC AUTO-ENTMCNC: 33.1 G/DL (ref 32–34.5)
MCV RBC AUTO: 91.1 FL (ref 80–99.9)
MONOCYTES NFR BLD: 0.5 K/UL (ref 0.1–0.95)
MONOCYTES NFR BLD: 7 % (ref 2–12)
NEUTROPHILS NFR BLD: 68 % (ref 43–80)
NEUTS SEG NFR BLD: 4.71 K/UL (ref 1.8–7.3)
PARTIAL THROMBOPLASTIN TIME: 30.9 SEC (ref 24.5–35.1)
PLATELET # BLD AUTO: 231 K/UL (ref 130–450)
PMV BLD AUTO: 10.9 FL (ref 7–12)
POTASSIUM SERPL-SCNC: 4.2 MMOL/L (ref 3.5–5)
PROT SERPL-MCNC: 7.2 G/DL (ref 6.4–8.3)
PROTHROMBIN TIME: 11.9 SEC (ref 9.3–12.4)
RBC # BLD AUTO: 4.51 M/UL (ref 3.5–5.5)
SODIUM SERPL-SCNC: 140 MMOL/L (ref 132–146)
WBC OTHER # BLD: 6.9 K/UL (ref 4.5–11.5)

## 2024-02-26 PROCEDURE — 2500000003 HC RX 250 WO HCPCS: Performed by: SURGERY

## 2024-02-26 PROCEDURE — 1200000000 HC SEMI PRIVATE

## 2024-02-26 PROCEDURE — 74018 RADEX ABDOMEN 1 VIEW: CPT

## 2024-02-26 PROCEDURE — 2580000003 HC RX 258

## 2024-02-26 PROCEDURE — 36415 COLL VENOUS BLD VENIPUNCTURE: CPT

## 2024-02-26 PROCEDURE — 7100000000 HC PACU RECOVERY - FIRST 15 MIN: Performed by: SURGERY

## 2024-02-26 PROCEDURE — 85025 COMPLETE CBC W/AUTO DIFF WBC: CPT

## 2024-02-26 PROCEDURE — 7100000001 HC PACU RECOVERY - ADDTL 15 MIN: Performed by: SURGERY

## 2024-02-26 PROCEDURE — 93005 ELECTROCARDIOGRAM TRACING: CPT | Performed by: INTERNAL MEDICINE

## 2024-02-26 PROCEDURE — 85014 HEMATOCRIT: CPT

## 2024-02-26 PROCEDURE — 3600000004 HC SURGERY LEVEL 4 BASE: Performed by: SURGERY

## 2024-02-26 PROCEDURE — 96361 HYDRATE IV INFUSION ADD-ON: CPT

## 2024-02-26 PROCEDURE — 83605 ASSAY OF LACTIC ACID: CPT

## 2024-02-26 PROCEDURE — 3700000001 HC ADD 15 MINUTES (ANESTHESIA): Performed by: SURGERY

## 2024-02-26 PROCEDURE — P9016 RBC LEUKOCYTES REDUCED: HCPCS

## 2024-02-26 PROCEDURE — 71275 CT ANGIOGRAPHY CHEST: CPT

## 2024-02-26 PROCEDURE — 6360000002 HC RX W HCPCS: Performed by: SURGERY

## 2024-02-26 PROCEDURE — 86900 BLOOD TYPING SEROLOGIC ABO: CPT

## 2024-02-26 PROCEDURE — 83690 ASSAY OF LIPASE: CPT

## 2024-02-26 PROCEDURE — 88307 TISSUE EXAM BY PATHOLOGIST: CPT

## 2024-02-26 PROCEDURE — 96365 THER/PROPH/DIAG IV INF INIT: CPT

## 2024-02-26 PROCEDURE — 74174 CTA ABD&PLVS W/CONTRAST: CPT

## 2024-02-26 PROCEDURE — 6360000002 HC RX W HCPCS: Performed by: NURSE ANESTHETIST, CERTIFIED REGISTERED

## 2024-02-26 PROCEDURE — 2720000010 HC SURG SUPPLY STERILE: Performed by: SURGERY

## 2024-02-26 PROCEDURE — 85730 THROMBOPLASTIN TIME PARTIAL: CPT

## 2024-02-26 PROCEDURE — 2500000003 HC RX 250 WO HCPCS: Performed by: NURSE ANESTHETIST, CERTIFIED REGISTERED

## 2024-02-26 PROCEDURE — 86901 BLOOD TYPING SEROLOGIC RH(D): CPT

## 2024-02-26 PROCEDURE — 3700000000 HC ANESTHESIA ATTENDED CARE: Performed by: SURGERY

## 2024-02-26 PROCEDURE — 86923 COMPATIBILITY TEST ELECTRIC: CPT

## 2024-02-26 PROCEDURE — 85610 PROTHROMBIN TIME: CPT

## 2024-02-26 PROCEDURE — 6370000000 HC RX 637 (ALT 250 FOR IP): Performed by: SURGERY

## 2024-02-26 PROCEDURE — 85018 HEMOGLOBIN: CPT

## 2024-02-26 PROCEDURE — 2580000003 HC RX 258: Performed by: NURSE ANESTHETIST, CERTIFIED REGISTERED

## 2024-02-26 PROCEDURE — 3600000014 HC SURGERY LEVEL 4 ADDTL 15MIN: Performed by: SURGERY

## 2024-02-26 PROCEDURE — 86850 RBC ANTIBODY SCREEN: CPT

## 2024-02-26 PROCEDURE — 2709999900 HC NON-CHARGEABLE SUPPLY: Performed by: SURGERY

## 2024-02-26 PROCEDURE — C9113 INJ PANTOPRAZOLE SODIUM, VIA: HCPCS

## 2024-02-26 PROCEDURE — 6360000004 HC RX CONTRAST MEDICATION: Performed by: RADIOLOGY

## 2024-02-26 PROCEDURE — 6360000002 HC RX W HCPCS

## 2024-02-26 PROCEDURE — 0DTF4ZZ RESECTION OF RIGHT LARGE INTESTINE, PERCUTANEOUS ENDOSCOPIC APPROACH: ICD-10-PCS | Performed by: SURGERY

## 2024-02-26 PROCEDURE — 6360000002 HC RX W HCPCS: Performed by: ANESTHESIOLOGY

## 2024-02-26 PROCEDURE — 2580000003 HC RX 258: Performed by: SURGERY

## 2024-02-26 PROCEDURE — 80053 COMPREHEN METABOLIC PANEL: CPT

## 2024-02-26 PROCEDURE — 99285 EMERGENCY DEPT VISIT HI MDM: CPT

## 2024-02-26 RX ORDER — WATER 10 ML/10ML
INJECTION INTRAMUSCULAR; INTRAVENOUS; SUBCUTANEOUS
Status: DISPENSED
Start: 2024-02-26 | End: 2024-02-27

## 2024-02-26 RX ORDER — DEXAMETHASONE SODIUM PHOSPHATE 10 MG/ML
INJECTION, SOLUTION INTRAMUSCULAR; INTRAVENOUS PRN
Status: DISCONTINUED | OUTPATIENT
Start: 2024-02-26 | End: 2024-02-26 | Stop reason: SDUPTHER

## 2024-02-26 RX ORDER — HYDRALAZINE HYDROCHLORIDE 20 MG/ML
10 INJECTION INTRAMUSCULAR; INTRAVENOUS
Status: DISCONTINUED | OUTPATIENT
Start: 2024-02-26 | End: 2024-02-26 | Stop reason: HOSPADM

## 2024-02-26 RX ORDER — FENTANYL CITRATE 50 UG/ML
INJECTION, SOLUTION INTRAMUSCULAR; INTRAVENOUS PRN
Status: DISCONTINUED | OUTPATIENT
Start: 2024-02-26 | End: 2024-02-26 | Stop reason: SDUPTHER

## 2024-02-26 RX ORDER — ONDANSETRON 2 MG/ML
INJECTION INTRAMUSCULAR; INTRAVENOUS PRN
Status: DISCONTINUED | OUTPATIENT
Start: 2024-02-26 | End: 2024-02-26 | Stop reason: SDUPTHER

## 2024-02-26 RX ORDER — FENTANYL CITRATE 0.05 MG/ML
25 INJECTION, SOLUTION INTRAMUSCULAR; INTRAVENOUS EVERY 5 MIN PRN
Status: DISCONTINUED | OUTPATIENT
Start: 2024-02-26 | End: 2024-02-26 | Stop reason: HOSPADM

## 2024-02-26 RX ORDER — PROPOFOL 10 MG/ML
INJECTION, EMULSION INTRAVENOUS PRN
Status: DISCONTINUED | OUTPATIENT
Start: 2024-02-26 | End: 2024-02-26 | Stop reason: SDUPTHER

## 2024-02-26 RX ORDER — IPRATROPIUM BROMIDE AND ALBUTEROL SULFATE 2.5; .5 MG/3ML; MG/3ML
1 SOLUTION RESPIRATORY (INHALATION)
Status: DISCONTINUED | OUTPATIENT
Start: 2024-02-26 | End: 2024-02-26

## 2024-02-26 RX ORDER — CEFOXITIN 2 G/1
INJECTION, POWDER, FOR SOLUTION INTRAVENOUS
Status: DISPENSED
Start: 2024-02-26 | End: 2024-02-27

## 2024-02-26 RX ORDER — TRAMADOL HYDROCHLORIDE 50 MG/1
25 TABLET ORAL EVERY 6 HOURS PRN
Status: DISCONTINUED | OUTPATIENT
Start: 2024-02-26 | End: 2024-03-06 | Stop reason: HOSPADM

## 2024-02-26 RX ORDER — SODIUM CHLORIDE 9 MG/ML
INJECTION, SOLUTION INTRAVENOUS CONTINUOUS PRN
Status: DISCONTINUED | OUTPATIENT
Start: 2024-02-26 | End: 2024-02-26 | Stop reason: SDUPTHER

## 2024-02-26 RX ORDER — METHOCARBAMOL 500 MG/1
500 TABLET, FILM COATED ORAL 4 TIMES DAILY
Status: DISCONTINUED | OUTPATIENT
Start: 2024-02-26 | End: 2024-03-06 | Stop reason: HOSPADM

## 2024-02-26 RX ORDER — LIDOCAINE HYDROCHLORIDE 20 MG/ML
INJECTION, SOLUTION INTRAVENOUS PRN
Status: DISCONTINUED | OUTPATIENT
Start: 2024-02-26 | End: 2024-02-26 | Stop reason: SDUPTHER

## 2024-02-26 RX ORDER — 0.9 % SODIUM CHLORIDE 0.9 %
1000 INTRAVENOUS SOLUTION INTRAVENOUS ONCE
Status: COMPLETED | OUTPATIENT
Start: 2024-02-26 | End: 2024-02-26

## 2024-02-26 RX ORDER — LIDOCAINE HYDROCHLORIDE AND EPINEPHRINE 5; 5 MG/ML; UG/ML
INJECTION, SOLUTION INFILTRATION; PERINEURAL PRN
Status: DISCONTINUED | OUTPATIENT
Start: 2024-02-26 | End: 2024-02-26 | Stop reason: ALTCHOICE

## 2024-02-26 RX ORDER — CEFOXITIN 2 G/1
2000 INJECTION, POWDER, FOR SOLUTION INTRAVENOUS
Status: DISCONTINUED | OUTPATIENT
Start: 2024-02-26 | End: 2024-02-26 | Stop reason: CLARIF

## 2024-02-26 RX ORDER — MORPHINE SULFATE 2 MG/ML
2 INJECTION, SOLUTION INTRAMUSCULAR; INTRAVENOUS
Status: DISCONTINUED | OUTPATIENT
Start: 2024-02-26 | End: 2024-03-06 | Stop reason: HOSPADM

## 2024-02-26 RX ORDER — MORPHINE SULFATE 2 MG/ML
2 INJECTION, SOLUTION INTRAMUSCULAR; INTRAVENOUS EVERY 5 MIN PRN
Status: DISCONTINUED | OUTPATIENT
Start: 2024-02-26 | End: 2024-02-26 | Stop reason: HOSPADM

## 2024-02-26 RX ORDER — TRAMADOL HYDROCHLORIDE 50 MG/1
50 TABLET ORAL EVERY 6 HOURS PRN
Status: DISCONTINUED | OUTPATIENT
Start: 2024-02-26 | End: 2024-03-06 | Stop reason: HOSPADM

## 2024-02-26 RX ORDER — MEPERIDINE HYDROCHLORIDE 25 MG/ML
12.5 INJECTION INTRAMUSCULAR; INTRAVENOUS; SUBCUTANEOUS EVERY 5 MIN PRN
Status: COMPLETED | OUTPATIENT
Start: 2024-02-26 | End: 2024-02-26

## 2024-02-26 RX ORDER — SODIUM CHLORIDE 9 MG/ML
INJECTION, SOLUTION INTRAVENOUS PRN
Status: DISCONTINUED | OUTPATIENT
Start: 2024-02-26 | End: 2024-03-06 | Stop reason: HOSPADM

## 2024-02-26 RX ORDER — MIDAZOLAM HYDROCHLORIDE 1 MG/ML
2 INJECTION INTRAMUSCULAR; INTRAVENOUS
Status: DISCONTINUED | OUTPATIENT
Start: 2024-02-26 | End: 2024-02-26 | Stop reason: HOSPADM

## 2024-02-26 RX ORDER — PROCHLORPERAZINE EDISYLATE 5 MG/ML
5 INJECTION INTRAMUSCULAR; INTRAVENOUS
Status: DISCONTINUED | OUTPATIENT
Start: 2024-02-26 | End: 2024-02-26 | Stop reason: HOSPADM

## 2024-02-26 RX ORDER — DIPHENHYDRAMINE HYDROCHLORIDE 50 MG/ML
12.5 INJECTION INTRAMUSCULAR; INTRAVENOUS
Status: DISCONTINUED | OUTPATIENT
Start: 2024-02-26 | End: 2024-02-26 | Stop reason: HOSPADM

## 2024-02-26 RX ORDER — LABETALOL HYDROCHLORIDE 5 MG/ML
10 INJECTION, SOLUTION INTRAVENOUS
Status: DISCONTINUED | OUTPATIENT
Start: 2024-02-26 | End: 2024-02-26 | Stop reason: HOSPADM

## 2024-02-26 RX ORDER — METHOCARBAMOL 100 MG/ML
1000 INJECTION, SOLUTION INTRAMUSCULAR; INTRAVENOUS ONCE
Status: COMPLETED | OUTPATIENT
Start: 2024-02-26 | End: 2024-02-26

## 2024-02-26 RX ORDER — KETOROLAC TROMETHAMINE 15 MG/ML
15 INJECTION, SOLUTION INTRAMUSCULAR; INTRAVENOUS
Status: COMPLETED | OUTPATIENT
Start: 2024-02-26 | End: 2024-02-26

## 2024-02-26 RX ORDER — EPHEDRINE SULFATE/0.9% NACL/PF 25 MG/5 ML
SYRINGE (ML) INTRAVENOUS PRN
Status: DISCONTINUED | OUTPATIENT
Start: 2024-02-26 | End: 2024-02-26 | Stop reason: SDUPTHER

## 2024-02-26 RX ORDER — ROCURONIUM BROMIDE 10 MG/ML
INJECTION, SOLUTION INTRAVENOUS PRN
Status: DISCONTINUED | OUTPATIENT
Start: 2024-02-26 | End: 2024-02-26 | Stop reason: SDUPTHER

## 2024-02-26 RX ORDER — ONDANSETRON 2 MG/ML
4 INJECTION INTRAMUSCULAR; INTRAVENOUS
Status: DISCONTINUED | OUTPATIENT
Start: 2024-02-26 | End: 2024-02-26 | Stop reason: HOSPADM

## 2024-02-26 RX ADMIN — ROCURONIUM BROMIDE 30 MG: 10 SOLUTION INTRAVENOUS at 15:15

## 2024-02-26 RX ADMIN — PHENYLEPHRINE HYDROCHLORIDE 100 MCG: 10 INJECTION INTRAVENOUS at 15:22

## 2024-02-26 RX ADMIN — PHENYLEPHRINE HYDROCHLORIDE 100 MCG: 10 INJECTION INTRAVENOUS at 15:20

## 2024-02-26 RX ADMIN — DEXAMETHASONE SODIUM PHOSPHATE 10 MG: 10 INJECTION, SOLUTION INTRAMUSCULAR; INTRAVENOUS at 15:24

## 2024-02-26 RX ADMIN — IOPAMIDOL 75 ML: 755 INJECTION, SOLUTION INTRAVENOUS at 10:08

## 2024-02-26 RX ADMIN — SUGAMMADEX 200 MG: 100 INJECTION, SOLUTION INTRAVENOUS at 16:08

## 2024-02-26 RX ADMIN — METHOCARBAMOL 500 MG: 500 TABLET ORAL at 22:32

## 2024-02-26 RX ADMIN — METHOCARBAMOL 1000 MG: 100 INJECTION INTRAMUSCULAR; INTRAVENOUS at 16:30

## 2024-02-26 RX ADMIN — PANTOPRAZOLE SODIUM 80 MG: 40 INJECTION, POWDER, FOR SOLUTION INTRAVENOUS at 09:03

## 2024-02-26 RX ADMIN — TRAMADOL HYDROCHLORIDE 50 MG: 50 TABLET ORAL at 22:32

## 2024-02-26 RX ADMIN — EPHEDRINE SULFATE 10 MG: 5 INJECTION INTRAVENOUS at 15:30

## 2024-02-26 RX ADMIN — PROPOFOL 120 MG: 10 INJECTION, EMULSION INTRAVENOUS at 15:15

## 2024-02-26 RX ADMIN — FENTANYL CITRATE 50 MCG: 50 INJECTION, SOLUTION INTRAMUSCULAR; INTRAVENOUS at 15:15

## 2024-02-26 RX ADMIN — MEPERIDINE HYDROCHLORIDE 12.5 MG: 25 INJECTION INTRAMUSCULAR; INTRAVENOUS; SUBCUTANEOUS at 16:30

## 2024-02-26 RX ADMIN — SODIUM CHLORIDE: 900 INJECTION, SOLUTION INTRAVENOUS at 15:06

## 2024-02-26 RX ADMIN — KETOROLAC TROMETHAMINE 15 MG: 15 INJECTION, SOLUTION INTRAMUSCULAR; INTRAVENOUS at 16:33

## 2024-02-26 RX ADMIN — MEPERIDINE HYDROCHLORIDE 12.5 MG: 25 INJECTION INTRAMUSCULAR; INTRAVENOUS; SUBCUTANEOUS at 16:35

## 2024-02-26 RX ADMIN — MORPHINE SULFATE 2 MG: 2 INJECTION, SOLUTION INTRAMUSCULAR; INTRAVENOUS at 17:18

## 2024-02-26 RX ADMIN — CEFOXITIN 2000 MG: 2 INJECTION, POWDER, FOR SOLUTION INTRAVENOUS at 15:15

## 2024-02-26 RX ADMIN — LIDOCAINE HYDROCHLORIDE 60 MG: 20 INJECTION, SOLUTION INTRAVENOUS at 15:15

## 2024-02-26 RX ADMIN — FENTANYL CITRATE 50 MCG: 50 INJECTION, SOLUTION INTRAMUSCULAR; INTRAVENOUS at 16:12

## 2024-02-26 RX ADMIN — ONDANSETRON 4 MG: 2 INJECTION INTRAMUSCULAR; INTRAVENOUS at 15:30

## 2024-02-26 RX ADMIN — SODIUM CHLORIDE 1000 ML: 9 INJECTION, SOLUTION INTRAVENOUS at 08:51

## 2024-02-26 ASSESSMENT — PAIN DESCRIPTION - DESCRIPTORS
DESCRIPTORS: SORE
DESCRIPTORS: ACHING;THROBBING;SORE
DESCRIPTORS: SORE

## 2024-02-26 ASSESSMENT — PAIN DESCRIPTION - PAIN TYPE: TYPE: SURGICAL PAIN

## 2024-02-26 ASSESSMENT — PAIN DESCRIPTION - LOCATION
LOCATION: ABDOMEN

## 2024-02-26 ASSESSMENT — PAIN DESCRIPTION - ORIENTATION
ORIENTATION: MID
ORIENTATION: RIGHT;LEFT
ORIENTATION: MID

## 2024-02-26 ASSESSMENT — PAIN - FUNCTIONAL ASSESSMENT: PAIN_FUNCTIONAL_ASSESSMENT: NONE - DENIES PAIN

## 2024-02-26 ASSESSMENT — LIFESTYLE VARIABLES
HOW OFTEN DO YOU HAVE A DRINK CONTAINING ALCOHOL: NEVER
HOW MANY STANDARD DRINKS CONTAINING ALCOHOL DO YOU HAVE ON A TYPICAL DAY: PATIENT DOES NOT DRINK

## 2024-02-26 ASSESSMENT — PAIN SCALES - GENERAL
PAINLEVEL_OUTOF10: 7
PAINLEVEL_OUTOF10: 0
PAINLEVEL_OUTOF10: 4
PAINLEVEL_OUTOF10: 5

## 2024-02-26 NOTE — ED PROVIDER NOTES
1945  Date of evaluation: 2/26/2024  Provider: Beatrice Fung MD  PCP: Viviana Cummins MD  Note Started: 10:42 AM EST 2/26/24    CHIEF COMPLAINT       Chief Complaint   Patient presents with    GI Bleeding     Onset 0600-dark and bright red-had normal bm at 0530       HISTORY OF PRESENT ILLNESS: 1 or more Elements   History From: Patient.    Limitations to history : None    Ana Garcia is a 78 y.o. female with history of atrial fibrillation, thoracic aortic aneurysm who presents to the ED with complaints of rectal bleeding.  Patient states that she had 2 large bloody bowel movements after waking up this morning, she states that she had large blood clots coming out which was bright red in color.  She denies any abdominal cramping or abdominal pain now.  Patient does have a history of thoracic aortic aneurysm and was placed stent 5 to 6 years ago.  Patient also does have a history of atrial fibrillation and is on aspirin and Plavix.  She is not on any anticoagulation.  She denies any chest pain or shortness of breath now.  She denies any hemoptysis.  She denies any constipation, diarrhea, urinary symptoms or any other active symptoms now.    Nursing Notes were all reviewed and agreed with or any disagreements were addressed in the HPI.    ROS:   Pertinent positives and negatives are stated within HPI, all other systems reviewed and are negative.    --------------------------------------------- PAST HISTORY ---------------------------------------------  Past Medical History:  has a past medical history of A-fib (HCC), Arthritis, Cancer (HCC), GERD (gastroesophageal reflux disease), Osteoporosis, and Thoracic aortic aneurysm (HCC).    Past Surgical History:  has a past surgical history that includes Breast surgery; Colonoscopy; Endoscopy, colon, diagnostic; vascular surgery; Total knee arthroplasty (Left, 3/14/2023); and laparotomy (Right, 2/26/2024).    Social History:  reports that she has never smoked.

## 2024-02-26 NOTE — CONSULTS
General Surgery Consult    Patient's Name/Date of Birth: Ana Garcia / 1945    Date: February 26, 2024     Consulting Surgeon: Juan Fox M.D.    PCP: Viviana Cummins MD     Chief Complaint: GI bleed     HPI:   Ana Garcia is a 78 y.o. female who presents for  evaluation of GI bleed rectally and has CT in ED showing blush on right colon it started this am and has several more in ED and now is unstable with BP.      Past Medical History:   Diagnosis Date    A-fib (HCC)     Arthritis     Cancer (HCC)     BREAST    GERD (gastroesophageal reflux disease)     Osteoporosis     Thoracic aortic aneurysm (HCC)        Past Surgical History:   Procedure Laterality Date    BREAST SURGERY      lumpectomy right breast    COLONOSCOPY      ENDOSCOPY, COLON, DIAGNOSTIC      TOTAL KNEE ARTHROPLASTY Left 3/14/2023    LEFT KNEE TOTAL ARTHROPLASTY performed by JOLEEN Campbell DO at [facility] OR    VASCULAR SURGERY      thoracic aortic aneurysm repair       Current Facility-Administered Medications   Medication Dose Route Frequency Provider Last Rate Last Admin    0.9 % sodium chloride infusion   IntraVENous PRN Beatrice Fung MD         Current Outpatient Medications   Medication Sig Dispense Refill    propafenone (RYTHMOL) 150 MG tablet Take 1 tablet by mouth in the morning and 1 tablet at noon and 1 tablet in the evening.      omeprazole (PRILOSEC) 20 MG delayed release capsule Take 1 capsule by mouth 2 times daily as needed      letrozole (FEMARA) 2.5 MG tablet TAKE 1 TABLET BY MOUTH DAILY      clopidogrel (PLAVIX) 75 MG tablet Take 1 tablet by mouth daily      aspirin 81 MG EC tablet Take 1 tablet by mouth daily         No Known Allergies    The patient has a family history that is negative for severe cardiovascular or respiratory issues, negative for reaction to anesthesia.    Social History     Socioeconomic History    Marital status:      Spouse name: Not on file    Number of children: Not on file    Years

## 2024-02-26 NOTE — OP NOTE
DATE OF PROCEDURE: 2/26/2024  SURGEON: DEREJE FREDERICK M.D.  ASSISTANT: fannie.  PREOPERATIVE DIAGNOSIS: Right colon bleed, shock hemorrhagic  POSTOPERATIVE DIAGNOSIS: same.  OPERATION:   1)Laparoscopic right hemicolectomy.  2)mobilization and placement of omental flap  ANESTHESIA: General.  ESTIMATED BLOOD LOSS: 100 mL.  COMPLICATIONS: None.  FLUIDS: Crystalloid.  Specimen: right colon  DISPOSITION: To be admitted in for routine postoperative care.  INDICATION: This is a 78-year-old female with the aforementioned diagnosis.  I explained the risks, benefits, potential outcomes, and alternative  treatment to the aforementioned procedure, and she agreed to proceed  understanding those risks and potential outcomes.  PROCEDURE: The patient was brought into the operative suite, was placed  under general anesthesia, had bilateral PCDs placed, was given preoperative  antibiotics within 30 minutes of incision, had a Chaudhary catheter placed. Once  this was done, a 5-mm incision was made supraumbilically after local  anesthetic was infiltrated into the abdominal wall. A Veress needle was  passed into the peritoneum. CO2 was used to insufflate the abdomen to a  pressure of 15 mmHg. At this time, the Veress needle was removed and a 5-mm  trocar was put in its place. The laparoscope was introduced through this  trocar and 2 additional trocars were placed; 1 in the suprapubic, 1 in the  left lower quadrant of the abdomen. An additional trocar was placed in the  left upper quadrant of the abdomen.  Once this was done, we were able to identify the ileocolic vessel and this  was exposed and skeletonized with the hook cautery. This was clipped with medium large clips and taken then with  the LigaSure device. We then bluntly dissected from the mediolateral  approach around the right colon, exposing up to the hepatic flexure and  taking that down as well with the LigaSure device.   Once the entire colon and terminal ileum were

## 2024-02-26 NOTE — ANESTHESIA PRE PROCEDURE
Department of Anesthesiology  Preprocedure Note       Name:  Ana Garcia   Age:  78 y.o.  :  1945                                          MRN:  [id number]         Date:  2024      Surgeon: Surgeon(s):  Juan Fox MD    Procedure: Procedure(s):  EXPLORATORY LAPAROTOMY WITH RIGHT COLON RESECTION POSSIBLE OSTEOTOMY    Medications prior to admission:   Prior to Admission medications    Medication Sig Start Date End Date Taking? Authorizing Provider   propafenone (RYTHMOL) 150 MG tablet Take 1 tablet by mouth in the morning and 1 tablet at noon and 1 tablet in the evening. 23   Ora Stanton MD   omeprazole (PRILOSEC) 20 MG delayed release capsule Take 1 capsule by mouth 2 times daily as needed 22   Ora Stanton MD   letrozole (FEMARA) 2.5 MG tablet TAKE 1 TABLET BY MOUTH DAILY 22   Ora Stanton MD   clopidogrel (PLAVIX) 75 MG tablet Take 1 tablet by mouth daily 2/3/23   Ora Stanton MD   aspirin 81 MG EC tablet Take 1 tablet by mouth daily    Ora Stanton MD       Current medications:    Current Facility-Administered Medications   Medication Dose Route Frequency Provider Last Rate Last Admin    0.9 % sodium chloride infusion   IntraVENous PRN Beatrice Fung MD         Current Outpatient Medications   Medication Sig Dispense Refill    propafenone (RYTHMOL) 150 MG tablet Take 1 tablet by mouth in the morning and 1 tablet at noon and 1 tablet in the evening.      omeprazole (PRILOSEC) 20 MG delayed release capsule Take 1 capsule by mouth 2 times daily as needed      letrozole (FEMARA) 2.5 MG tablet TAKE 1 TABLET BY MOUTH DAILY      clopidogrel (PLAVIX) 75 MG tablet Take 1 tablet by mouth daily      aspirin 81 MG EC tablet Take 1 tablet by mouth daily         Allergies:  No Known Allergies    Problem List:    Patient Active Problem List   Diagnosis Code    Degenerative arthritis of left knee M17.12    Acute GI bleeding K92.2    Perforated

## 2024-02-26 NOTE — ED NOTES
Assisted patient to bedside commode. Patient had a large bloody BM. Primary nurse aware. Family at bedside.

## 2024-02-26 NOTE — H&P
emergency department.    Genito-Urinary:    Denies any urgency, frequency, hematuria.  Voiding without difficulty.    Musculoskeletal:   Denies joint pain, joint stiffness, joint swelling or muscle pain    Neurology:    Denies any headache or focal neurological deficits. No weakness or paresthesia.    Derm:    Denies any rashes, ulcers, or excoriations.  Denies bruising.      Extremities:   Denies any lower extremity swelling or edema.      PHYSICAL EXAM:  VITALS:  Vitals:    02/26/24 0931   BP: (!) 140/102   Pulse: 74   Resp: 20   Temp:    SpO2:          CONSTITUTIONAL:    Awake, alert, cooperative, no apparent distress, and appears stated age    EYES:    PERRL, EOMI, sclera clear, conjunctiva normal    ENT:    Normocephalic, atraumatic, sinuses nontender on palpation. External ears without lesions. Oral pharynx with moist mucus membranes.  Tonsils without erythema or exudates.    NECK:    Supple, symmetrical, trachea midline, no adenopathy, thyroid symmetric, not enlarged and no tenderness, skin normal, no bruits, no JVD    HEMATOLOGIC/LYMPHATICS:    No cervical lymphadenopathy and no supraclavicular lymphadenopathy    LUNGS:    Symmetric. No increased work of breathing, good air exchange, clear to auscultation bilaterally, no wheezes, rhonchi, or rales,     CARDIOVASCULAR:    Normal apical impulse, regular rate and rhythm, normal S1 and S2, no S3 or S4, and no murmur noted    ABDOMEN:    Soft.  No significant tenderness to palpation.  No rebound or guarding.  Bowel sounds are active.    MUSCULOSKELETAL:    There is no redness, warmth, or swelling of the joints.  Full range of motion noted.  Motor strength is 5 out of 5 all extremities bilaterally.  Tone is normal.    NEUROLOGIC:    Awake, alert, oriented to name, place and time.  Cranial nerves II-XII are grossly intact.  Motor is 5 out of 5 bilaterally.      SKIN:    No bruising or bleeding.  No redness, warmth, or swelling    EXTREMITIES:    Peripheral pulses

## 2024-02-26 NOTE — ANESTHESIA POSTPROCEDURE EVALUATION
Department of Anesthesiology  Postprocedure Note    Patient: Ana Garcia  MRN: [id number]  YOB: 1945  Date of evaluation: 2/26/2024    Procedure Summary       Date: 02/26/24 Room / Location: [address]    Anesthesia Start: 1507 Anesthesia Stop: 1623    Procedure: EXPLORATORY LAPAROTOMY WITH RIGHT COLON RESECTION POSSIBLE OSTEOTOMY (Right: Abdomen) Diagnosis:       Colon obstruction (HCC)      (Colon obstruction (HCC) [K56.609])    Surgeons: Juan Fox MD Responsible Provider: Vince Gresham MD    Anesthesia Type: general ASA Status: 3 - Emergent            Anesthesia Type: No value filed.    Yossi Phase I: Yossi Score: 9    Yossi Phase II:      Anesthesia Post Evaluation    Patient location during evaluation: PACU  Patient participation: complete - patient participated  Level of consciousness: awake  Airway patency: patent  Nausea & Vomiting: no nausea and no vomiting  Cardiovascular status: hemodynamically stable  Respiratory status: acceptable  Hydration status: euvolemic  Pain management: adequate    No notable events documented.

## 2024-02-27 LAB
EKG ATRIAL RATE: 86 BPM
EKG P AXIS: 43 DEGREES
EKG P-R INTERVAL: 168 MS
EKG Q-T INTERVAL: 410 MS
EKG QRS DURATION: 78 MS
EKG QTC CALCULATION (BAZETT): 490 MS
EKG R AXIS: -15 DEGREES
EKG T AXIS: 59 DEGREES
EKG VENTRICULAR RATE: 86 BPM
HCT VFR BLD AUTO: 30.4 % (ref 34–48)
HGB BLD-MCNC: 9.9 G/DL (ref 11.5–15.5)

## 2024-02-27 PROCEDURE — 85018 HEMOGLOBIN: CPT

## 2024-02-27 PROCEDURE — 6370000000 HC RX 637 (ALT 250 FOR IP): Performed by: SURGERY

## 2024-02-27 PROCEDURE — 1200000000 HC SEMI PRIVATE

## 2024-02-27 PROCEDURE — 2580000003 HC RX 258: Performed by: INTERNAL MEDICINE

## 2024-02-27 PROCEDURE — 97530 THERAPEUTIC ACTIVITIES: CPT | Performed by: PHYSICAL THERAPIST

## 2024-02-27 PROCEDURE — 85014 HEMATOCRIT: CPT

## 2024-02-27 PROCEDURE — 6370000000 HC RX 637 (ALT 250 FOR IP): Performed by: INTERNAL MEDICINE

## 2024-02-27 PROCEDURE — 97161 PT EVAL LOW COMPLEX 20 MIN: CPT | Performed by: PHYSICAL THERAPIST

## 2024-02-27 PROCEDURE — 36415 COLL VENOUS BLD VENIPUNCTURE: CPT

## 2024-02-27 RX ORDER — PROPAFENONE HYDROCHLORIDE 150 MG/1
150 TABLET, COATED ORAL EVERY 8 HOURS
Status: DISCONTINUED | OUTPATIENT
Start: 2024-02-27 | End: 2024-03-06 | Stop reason: HOSPADM

## 2024-02-27 RX ORDER — POTASSIUM CHLORIDE 20 MEQ/1
40 TABLET, EXTENDED RELEASE ORAL PRN
Status: DISCONTINUED | OUTPATIENT
Start: 2024-02-27 | End: 2024-03-06 | Stop reason: HOSPADM

## 2024-02-27 RX ORDER — MAGNESIUM SULFATE IN WATER 40 MG/ML
2000 INJECTION, SOLUTION INTRAVENOUS PRN
Status: DISCONTINUED | OUTPATIENT
Start: 2024-02-27 | End: 2024-03-06 | Stop reason: HOSPADM

## 2024-02-27 RX ORDER — SODIUM CHLORIDE 9 MG/ML
INJECTION, SOLUTION INTRAVENOUS CONTINUOUS
Status: DISCONTINUED | OUTPATIENT
Start: 2024-02-27 | End: 2024-02-28

## 2024-02-27 RX ORDER — POTASSIUM CHLORIDE 7.45 MG/ML
10 INJECTION INTRAVENOUS PRN
Status: DISCONTINUED | OUTPATIENT
Start: 2024-02-27 | End: 2024-03-06 | Stop reason: HOSPADM

## 2024-02-27 RX ORDER — LETROZOLE 2.5 MG/1
2.5 TABLET, FILM COATED ORAL DAILY
Status: DISCONTINUED | OUTPATIENT
Start: 2024-02-27 | End: 2024-03-06 | Stop reason: HOSPADM

## 2024-02-27 RX ORDER — ENOXAPARIN SODIUM 100 MG/ML
40 INJECTION SUBCUTANEOUS DAILY
Status: DISCONTINUED | OUTPATIENT
Start: 2024-02-28 | End: 2024-03-01

## 2024-02-27 RX ADMIN — TRAMADOL HYDROCHLORIDE 50 MG: 50 TABLET ORAL at 05:22

## 2024-02-27 RX ADMIN — PROPAFENONE HYDROCHLORIDE 150 MG: 150 TABLET, FILM COATED ORAL at 08:50

## 2024-02-27 RX ADMIN — PROPAFENONE HYDROCHLORIDE 150 MG: 150 TABLET, FILM COATED ORAL at 16:19

## 2024-02-27 RX ADMIN — METHOCARBAMOL 500 MG: 500 TABLET ORAL at 12:39

## 2024-02-27 RX ADMIN — PROPAFENONE HYDROCHLORIDE 150 MG: 150 TABLET, FILM COATED ORAL at 22:12

## 2024-02-27 RX ADMIN — METHOCARBAMOL 500 MG: 500 TABLET ORAL at 08:51

## 2024-02-27 RX ADMIN — METHOCARBAMOL 500 MG: 500 TABLET ORAL at 16:19

## 2024-02-27 RX ADMIN — LETROZOLE 2.5 MG: 2.5 TABLET ORAL at 08:51

## 2024-02-27 RX ADMIN — METHOCARBAMOL 500 MG: 500 TABLET ORAL at 22:12

## 2024-02-27 RX ADMIN — TRAMADOL HYDROCHLORIDE 50 MG: 50 TABLET ORAL at 10:57

## 2024-02-27 RX ADMIN — SODIUM CHLORIDE: 9 INJECTION, SOLUTION INTRAVENOUS at 08:39

## 2024-02-27 ASSESSMENT — PAIN SCALES - GENERAL
PAINLEVEL_OUTOF10: 0
PAINLEVEL_OUTOF10: 2
PAINLEVEL_OUTOF10: 3
PAINLEVEL_OUTOF10: 2
PAINLEVEL_OUTOF10: 6

## 2024-02-27 ASSESSMENT — PAIN DESCRIPTION - ORIENTATION
ORIENTATION: RIGHT;LEFT
ORIENTATION: RIGHT;LEFT

## 2024-02-27 ASSESSMENT — PAIN DESCRIPTION - DESCRIPTORS
DESCRIPTORS: DISCOMFORT
DESCRIPTORS: ACHING;SORE;SHARP
DESCRIPTORS: ACHING;SORE;THROBBING
DESCRIPTORS: DISCOMFORT

## 2024-02-27 ASSESSMENT — PAIN DESCRIPTION - LOCATION
LOCATION: ABDOMEN

## 2024-02-27 ASSESSMENT — PAIN DESCRIPTION - PAIN TYPE
TYPE: SURGICAL PAIN
TYPE: SURGICAL PAIN

## 2024-02-27 NOTE — CARE COORDINATION
Case Management Assessment  Initial Evaluation    Date/Time of Evaluation: 2/27/2024 11:08 AM  Assessment Completed by: SYMONE Cooper    If patient is discharged prior to next notation, then this note serves as note for discharge by case management.    Patient Name: Ana Garcia                   YOB: 1945  Diagnosis: Colon obstruction (HCC) [K56.609]  Perforated diverticulum [K57.80]                   Date / Time: 2/26/2024  7:58 AM    Patient Admission Status: Inpatient   Readmission Risk (Low < 19, Mod (19-27), High > 27): Readmission Risk Score: 7.5    Current PCP: Viviana Cummins MD  PCP verified by CM? Yes    Chart Reviewed: Yes      History Provided by: Patient  Patient Orientation: Alert and Oriented    Patient Cognition: Alert    Hospitalization in the last 30 days (Readmission):  No    If yes, Readmission Assessment in CM Navigator will be completed.    Advance Directives:      Code Status: Full Code   Patient's Primary Decision Maker is: Legal Next of Kin    Primary Decision Maker: julisa kumar - Child - [phone]    Discharge Planning:    Patient lives with: Family Members Type of Home: Other (Comment) (modular home)  Primary Care Giver: Self  Patient Support Systems include: Children, Family Members   Current Financial resources: Medicare  Current community resources: None  Current services prior to admission: None            Current DME:              Type of Home Care services:       ADLS  Prior functional level: Independent in ADLs/IADLs  Current functional level: Independent in ADLs/IADLs    PT AM-PAC:   /24  OT AM-PAC:   /24    Family can provide assistance at DC: Yes  Would you like Case Management to discuss the discharge plan with any other family members/significant others, and if so, who? No  Plans to Return to Present Housing: Yes    Potential Assistance needed at discharge: N/A            Potential DME:    Patient expects to discharge to:

## 2024-02-28 LAB
ANION GAP SERPL CALCULATED.3IONS-SCNC: 7 MMOL/L (ref 7–16)
BASOPHILS # BLD: 0.02 K/UL (ref 0–0.2)
BASOPHILS NFR BLD: 0 % (ref 0–2)
BUN SERPL-MCNC: 22 MG/DL (ref 6–23)
CALCIUM SERPL-MCNC: 8.2 MG/DL (ref 8.6–10.2)
CHLORIDE SERPL-SCNC: 106 MMOL/L (ref 98–107)
CO2 SERPL-SCNC: 22 MMOL/L (ref 22–29)
CREAT SERPL-MCNC: 0.8 MG/DL (ref 0.5–1)
EOSINOPHIL # BLD: 0.01 K/UL (ref 0.05–0.5)
EOSINOPHILS RELATIVE PERCENT: 0 % (ref 0–6)
ERYTHROCYTE [DISTWIDTH] IN BLOOD BY AUTOMATED COUNT: 15.6 % (ref 11.5–15)
GFR SERPL CREATININE-BSD FRML MDRD: >60 ML/MIN/1.73M2
GLUCOSE SERPL-MCNC: 124 MG/DL (ref 74–99)
HCT VFR BLD AUTO: 25.2 % (ref 34–48)
HGB BLD-MCNC: 8.3 G/DL (ref 11.5–15.5)
IMM GRANULOCYTES # BLD AUTO: 0.1 K/UL (ref 0–0.58)
IMM GRANULOCYTES NFR BLD: 1 % (ref 0–5)
LYMPHOCYTES NFR BLD: 1.13 K/UL (ref 1.5–4)
LYMPHOCYTES RELATIVE PERCENT: 7 % (ref 20–42)
MAGNESIUM SERPL-MCNC: 1.8 MG/DL (ref 1.6–2.6)
MCH RBC QN AUTO: 30.9 PG (ref 26–35)
MCHC RBC AUTO-ENTMCNC: 32.9 G/DL (ref 32–34.5)
MCV RBC AUTO: 93.7 FL (ref 80–99.9)
MONOCYTES NFR BLD: 1.18 K/UL (ref 0.1–0.95)
MONOCYTES NFR BLD: 7 % (ref 2–12)
NEUTROPHILS NFR BLD: 85 % (ref 43–80)
NEUTS SEG NFR BLD: 14.28 K/UL (ref 1.8–7.3)
PHOSPHATE SERPL-MCNC: 2 MG/DL (ref 2.5–4.5)
PLATELET, FLUORESCENCE: 144 K/UL (ref 130–450)
PMV BLD AUTO: 10.9 FL (ref 7–12)
POTASSIUM SERPL-SCNC: 4.5 MMOL/L (ref 3.5–5)
RBC # BLD AUTO: 2.69 M/UL (ref 3.5–5.5)
SODIUM SERPL-SCNC: 135 MMOL/L (ref 132–146)
WBC OTHER # BLD: 16.7 K/UL (ref 4.5–11.5)

## 2024-02-28 PROCEDURE — 97530 THERAPEUTIC ACTIVITIES: CPT

## 2024-02-28 PROCEDURE — 36415 COLL VENOUS BLD VENIPUNCTURE: CPT

## 2024-02-28 PROCEDURE — 83735 ASSAY OF MAGNESIUM: CPT

## 2024-02-28 PROCEDURE — 80048 BASIC METABOLIC PNL TOTAL CA: CPT

## 2024-02-28 PROCEDURE — 6370000000 HC RX 637 (ALT 250 FOR IP): Performed by: INTERNAL MEDICINE

## 2024-02-28 PROCEDURE — 6360000002 HC RX W HCPCS: Performed by: INTERNAL MEDICINE

## 2024-02-28 PROCEDURE — 1200000000 HC SEMI PRIVATE

## 2024-02-28 PROCEDURE — 97165 OT EVAL LOW COMPLEX 30 MIN: CPT

## 2024-02-28 PROCEDURE — 97110 THERAPEUTIC EXERCISES: CPT

## 2024-02-28 PROCEDURE — 85025 COMPLETE CBC W/AUTO DIFF WBC: CPT

## 2024-02-28 PROCEDURE — 99024 POSTOP FOLLOW-UP VISIT: CPT | Performed by: SURGERY

## 2024-02-28 PROCEDURE — 84100 ASSAY OF PHOSPHORUS: CPT

## 2024-02-28 PROCEDURE — 97535 SELF CARE MNGMENT TRAINING: CPT

## 2024-02-28 PROCEDURE — 6370000000 HC RX 637 (ALT 250 FOR IP): Performed by: SURGERY

## 2024-02-28 RX ADMIN — METHOCARBAMOL 500 MG: 500 TABLET ORAL at 07:54

## 2024-02-28 RX ADMIN — METHOCARBAMOL 500 MG: 500 TABLET ORAL at 13:20

## 2024-02-28 RX ADMIN — ENOXAPARIN SODIUM 40 MG: 100 INJECTION SUBCUTANEOUS at 07:54

## 2024-02-28 RX ADMIN — METHOCARBAMOL 500 MG: 500 TABLET ORAL at 16:20

## 2024-02-28 RX ADMIN — METHOCARBAMOL 500 MG: 500 TABLET ORAL at 19:55

## 2024-02-28 RX ADMIN — LETROZOLE 2.5 MG: 2.5 TABLET ORAL at 13:20

## 2024-02-28 ASSESSMENT — PAIN SCALES - GENERAL: PAINLEVEL_OUTOF10: 0

## 2024-02-28 ASSESSMENT — PAIN DESCRIPTION - LOCATION: LOCATION: ABDOMEN

## 2024-02-28 NOTE — CARE COORDINATION
CM note: post op day 2 right hemicolectomy.  Pt plans to return home where she lives with her grandson at discharge.  PT recommending home PT, [facility], will discuss with patient.  No other needs at this time.

## 2024-02-28 NOTE — ACP (ADVANCE CARE PLANNING)
Advance Care Planning   Healthcare Decision Maker:    Primary Decision Maker: julisa kumar - [facility] - [phone]

## 2024-02-29 LAB
ANION GAP SERPL CALCULATED.3IONS-SCNC: 7 MMOL/L (ref 7–16)
BASOPHILS # BLD: 0.02 K/UL (ref 0–0.2)
BASOPHILS NFR BLD: 0 % (ref 0–2)
BUN SERPL-MCNC: 12 MG/DL (ref 6–23)
CALCIUM SERPL-MCNC: 8.4 MG/DL (ref 8.6–10.2)
CHLORIDE SERPL-SCNC: 106 MMOL/L (ref 98–107)
CO2 SERPL-SCNC: 24 MMOL/L (ref 22–29)
CREAT SERPL-MCNC: 0.6 MG/DL (ref 0.5–1)
EOSINOPHIL # BLD: 0.03 K/UL (ref 0.05–0.5)
EOSINOPHILS RELATIVE PERCENT: 0 % (ref 0–6)
ERYTHROCYTE [DISTWIDTH] IN BLOOD BY AUTOMATED COUNT: 15.1 % (ref 11.5–15)
GFR SERPL CREATININE-BSD FRML MDRD: >60 ML/MIN/1.73M2
GLUCOSE SERPL-MCNC: 99 MG/DL (ref 74–99)
HCT VFR BLD AUTO: 24.4 % (ref 34–48)
HGB BLD-MCNC: 8.1 G/DL (ref 11.5–15.5)
IMM GRANULOCYTES # BLD AUTO: 0.09 K/UL (ref 0–0.58)
IMM GRANULOCYTES NFR BLD: 1 % (ref 0–5)
LYMPHOCYTES NFR BLD: 1.06 K/UL (ref 1.5–4)
LYMPHOCYTES RELATIVE PERCENT: 9 % (ref 20–42)
MAGNESIUM SERPL-MCNC: 1.9 MG/DL (ref 1.6–2.6)
MCH RBC QN AUTO: 31.2 PG (ref 26–35)
MCHC RBC AUTO-ENTMCNC: 33.2 G/DL (ref 32–34.5)
MCV RBC AUTO: 93.8 FL (ref 80–99.9)
MONOCYTES NFR BLD: 0.83 K/UL (ref 0.1–0.95)
MONOCYTES NFR BLD: 7 % (ref 2–12)
NEUTROPHILS NFR BLD: 82 % (ref 43–80)
NEUTS SEG NFR BLD: 9.35 K/UL (ref 1.8–7.3)
PHOSPHATE SERPL-MCNC: 1.8 MG/DL (ref 2.5–4.5)
PLATELET # BLD AUTO: 160 K/UL (ref 130–450)
PMV BLD AUTO: 11.2 FL (ref 7–12)
POTASSIUM SERPL-SCNC: 4 MMOL/L (ref 3.5–5)
RBC # BLD AUTO: 2.6 M/UL (ref 3.5–5.5)
SODIUM SERPL-SCNC: 137 MMOL/L (ref 132–146)
WBC OTHER # BLD: 11.4 K/UL (ref 4.5–11.5)

## 2024-02-29 PROCEDURE — 97535 SELF CARE MNGMENT TRAINING: CPT

## 2024-02-29 PROCEDURE — 2580000003 HC RX 258: Performed by: INTERNAL MEDICINE

## 2024-02-29 PROCEDURE — 85025 COMPLETE CBC W/AUTO DIFF WBC: CPT

## 2024-02-29 PROCEDURE — 99024 POSTOP FOLLOW-UP VISIT: CPT | Performed by: SURGERY

## 2024-02-29 PROCEDURE — 6370000000 HC RX 637 (ALT 250 FOR IP): Performed by: INTERNAL MEDICINE

## 2024-02-29 PROCEDURE — 1200000000 HC SEMI PRIVATE

## 2024-02-29 PROCEDURE — 83735 ASSAY OF MAGNESIUM: CPT

## 2024-02-29 PROCEDURE — 6370000000 HC RX 637 (ALT 250 FOR IP): Performed by: SURGERY

## 2024-02-29 PROCEDURE — 6360000002 HC RX W HCPCS: Performed by: INTERNAL MEDICINE

## 2024-02-29 PROCEDURE — 2500000003 HC RX 250 WO HCPCS: Performed by: INTERNAL MEDICINE

## 2024-02-29 PROCEDURE — 97530 THERAPEUTIC ACTIVITIES: CPT

## 2024-02-29 PROCEDURE — 84100 ASSAY OF PHOSPHORUS: CPT

## 2024-02-29 PROCEDURE — 80048 BASIC METABOLIC PNL TOTAL CA: CPT

## 2024-02-29 PROCEDURE — 97110 THERAPEUTIC EXERCISES: CPT

## 2024-02-29 PROCEDURE — 36415 COLL VENOUS BLD VENIPUNCTURE: CPT

## 2024-02-29 RX ADMIN — ENOXAPARIN SODIUM 40 MG: 100 INJECTION SUBCUTANEOUS at 17:02

## 2024-02-29 RX ADMIN — LETROZOLE 2.5 MG: 2.5 TABLET ORAL at 10:11

## 2024-02-29 RX ADMIN — SODIUM PHOSPHATE, MONOBASIC, MONOHYDRATE AND SODIUM PHOSPHATE, DIBASIC, ANHYDROUS 14.52 MMOL: 142; 276 INJECTION, SOLUTION INTRAVENOUS at 21:51

## 2024-02-29 RX ADMIN — METHOCARBAMOL 500 MG: 500 TABLET ORAL at 19:53

## 2024-02-29 RX ADMIN — METHOCARBAMOL 500 MG: 500 TABLET ORAL at 10:11

## 2024-02-29 RX ADMIN — METHOCARBAMOL 500 MG: 500 TABLET ORAL at 16:59

## 2024-02-29 ASSESSMENT — PAIN SCALES - GENERAL
PAINLEVEL_OUTOF10: 4
PAINLEVEL_OUTOF10: 5
PAINLEVEL_OUTOF10: 0
PAINLEVEL_OUTOF10: 9
PAINLEVEL_OUTOF10: 0

## 2024-02-29 ASSESSMENT — PAIN DESCRIPTION - LOCATION
LOCATION: ABDOMEN
LOCATION: ABDOMEN

## 2024-02-29 ASSESSMENT — PAIN - FUNCTIONAL ASSESSMENT
PAIN_FUNCTIONAL_ASSESSMENT: ACTIVITIES ARE NOT PREVENTED

## 2024-02-29 ASSESSMENT — PAIN DESCRIPTION - DESCRIPTORS: DESCRIPTORS: ACHING

## 2024-02-29 ASSESSMENT — PAIN DESCRIPTION - ORIENTATION: ORIENTATION: MID

## 2024-02-29 NOTE — CARE COORDINATION
CM note; Post op day #2 right hemicolectomy.  Per general surgery note, possible cyst excision tomorrow. Met with patient and her daughter re:PT recommendation for home therapy.  Patient and daughter are in agreement.  Pt has used [facility] in the past and prefers this provider again.  Referral called, order written.

## 2024-03-01 PROBLEM — I48.91 ATRIAL FIBRILLATION WITH RVR (HCC): Status: ACTIVE | Noted: 2024-03-01

## 2024-03-01 LAB
ABO/RH: NORMAL
ANION GAP SERPL CALCULATED.3IONS-SCNC: 8 MMOL/L (ref 7–16)
ANTIBODY SCREEN: NEGATIVE
ARM BAND NUMBER: NORMAL
BASOPHILS # BLD: 0.02 K/UL (ref 0–0.2)
BASOPHILS NFR BLD: 0 % (ref 0–2)
BLOOD BANK BLOOD PRODUCT EXPIRATION DATE: NORMAL
BLOOD BANK DISPENSE STATUS: NORMAL
BLOOD BANK ISBT PRODUCT BLOOD TYPE: 5100
BLOOD BANK PRODUCT CODE: NORMAL
BLOOD BANK SAMPLE EXPIRATION: NORMAL
BLOOD BANK UNIT TYPE AND RH: NORMAL
BPU ID: NORMAL
BUN SERPL-MCNC: 15 MG/DL (ref 6–23)
CALCIUM SERPL-MCNC: 8.8 MG/DL (ref 8.6–10.2)
CHLORIDE SERPL-SCNC: 108 MMOL/L (ref 98–107)
CO2 SERPL-SCNC: 26 MMOL/L (ref 22–29)
COMPONENT: NORMAL
CREAT SERPL-MCNC: 0.7 MG/DL (ref 0.5–1)
CROSSMATCH RESULT: NORMAL
EOSINOPHIL # BLD: 0.14 K/UL (ref 0.05–0.5)
EOSINOPHILS RELATIVE PERCENT: 2 % (ref 0–6)
ERYTHROCYTE [DISTWIDTH] IN BLOOD BY AUTOMATED COUNT: 15.3 % (ref 11.5–15)
GFR SERPL CREATININE-BSD FRML MDRD: >60 ML/MIN/1.73M2
GLUCOSE SERPL-MCNC: 106 MG/DL (ref 74–99)
HCT VFR BLD AUTO: 23.3 % (ref 34–48)
HGB BLD-MCNC: 7.6 G/DL (ref 11.5–15.5)
IMM GRANULOCYTES # BLD AUTO: 0.07 K/UL (ref 0–0.58)
IMM GRANULOCYTES NFR BLD: 1 % (ref 0–5)
LYMPHOCYTES NFR BLD: 1.06 K/UL (ref 1.5–4)
LYMPHOCYTES RELATIVE PERCENT: 14 % (ref 20–42)
MAGNESIUM SERPL-MCNC: 1.8 MG/DL (ref 1.6–2.6)
MCH RBC QN AUTO: 30.9 PG (ref 26–35)
MCHC RBC AUTO-ENTMCNC: 32.6 G/DL (ref 32–34.5)
MCV RBC AUTO: 94.7 FL (ref 80–99.9)
MONOCYTES NFR BLD: 0.67 K/UL (ref 0.1–0.95)
MONOCYTES NFR BLD: 9 % (ref 2–12)
NEUTROPHILS NFR BLD: 73 % (ref 43–80)
NEUTS SEG NFR BLD: 5.41 K/UL (ref 1.8–7.3)
PHOSPHATE SERPL-MCNC: 3.3 MG/DL (ref 2.5–4.5)
PLATELET # BLD AUTO: 194 K/UL (ref 130–450)
PMV BLD AUTO: 10.9 FL (ref 7–12)
POTASSIUM SERPL-SCNC: 3.8 MMOL/L (ref 3.5–5)
RBC # BLD AUTO: 2.46 M/UL (ref 3.5–5.5)
SODIUM SERPL-SCNC: 142 MMOL/L (ref 132–146)
T4 FREE SERPL-MCNC: 1.4 NG/DL (ref 0.9–1.7)
TRANSFUSION STATUS: NORMAL
TROPONIN I SERPL HS-MCNC: 38 NG/L (ref 0–9)
TSH SERPL DL<=0.05 MIU/L-ACNC: 3.44 UIU/ML (ref 0.27–4.2)
UNIT DIVISION: 0
UNIT ISSUE DATE/TIME: NORMAL
WBC OTHER # BLD: 7.4 K/UL (ref 4.5–11.5)

## 2024-03-01 PROCEDURE — 84439 ASSAY OF FREE THYROXINE: CPT

## 2024-03-01 PROCEDURE — 6360000002 HC RX W HCPCS: Performed by: SPECIALIST

## 2024-03-01 PROCEDURE — 2060000000 HC ICU INTERMEDIATE R&B

## 2024-03-01 PROCEDURE — 84443 ASSAY THYROID STIM HORMONE: CPT

## 2024-03-01 PROCEDURE — 99291 CRITICAL CARE FIRST HOUR: CPT | Performed by: FAMILY MEDICINE

## 2024-03-01 PROCEDURE — 6360000002 HC RX W HCPCS

## 2024-03-01 PROCEDURE — 2580000003 HC RX 258: Performed by: INTERNAL MEDICINE

## 2024-03-01 PROCEDURE — 85025 COMPLETE CBC W/AUTO DIFF WBC: CPT

## 2024-03-01 PROCEDURE — 6360000002 HC RX W HCPCS: Performed by: FAMILY MEDICINE

## 2024-03-01 PROCEDURE — 2580000003 HC RX 258: Performed by: SPECIALIST

## 2024-03-01 PROCEDURE — 97535 SELF CARE MNGMENT TRAINING: CPT

## 2024-03-01 PROCEDURE — 2500000003 HC RX 250 WO HCPCS

## 2024-03-01 PROCEDURE — 84484 ASSAY OF TROPONIN QUANT: CPT

## 2024-03-01 PROCEDURE — 6370000000 HC RX 637 (ALT 250 FOR IP): Performed by: INTERNAL MEDICINE

## 2024-03-01 PROCEDURE — 93005 ELECTROCARDIOGRAM TRACING: CPT

## 2024-03-01 PROCEDURE — 83735 ASSAY OF MAGNESIUM: CPT

## 2024-03-01 PROCEDURE — 36415 COLL VENOUS BLD VENIPUNCTURE: CPT

## 2024-03-01 PROCEDURE — 80048 BASIC METABOLIC PNL TOTAL CA: CPT

## 2024-03-01 PROCEDURE — 84100 ASSAY OF PHOSPHORUS: CPT

## 2024-03-01 RX ORDER — ENOXAPARIN SODIUM 100 MG/ML
1 INJECTION SUBCUTANEOUS 2 TIMES DAILY
Status: DISCONTINUED | OUTPATIENT
Start: 2024-03-01 | End: 2024-03-05

## 2024-03-01 RX ORDER — SODIUM CHLORIDE 9 MG/ML
INJECTION, SOLUTION INTRAVENOUS ONCE
Status: COMPLETED | OUTPATIENT
Start: 2024-03-01 | End: 2024-03-01

## 2024-03-01 RX ORDER — METOPROLOL TARTRATE 1 MG/ML
5 INJECTION, SOLUTION INTRAVENOUS EVERY 5 MIN PRN
Status: COMPLETED | OUTPATIENT
Start: 2024-03-01 | End: 2024-03-01

## 2024-03-01 RX ORDER — DIGOXIN 0.25 MG/ML
250 INJECTION INTRAMUSCULAR; INTRAVENOUS ONCE
Status: COMPLETED | OUTPATIENT
Start: 2024-03-01 | End: 2024-03-01

## 2024-03-01 RX ORDER — DILTIAZEM HYDROCHLORIDE 5 MG/ML
10 INJECTION INTRAVENOUS ONCE
Status: DISCONTINUED | OUTPATIENT
Start: 2024-03-01 | End: 2024-03-01

## 2024-03-01 RX ADMIN — PROPAFENONE HYDROCHLORIDE 150 MG: 150 TABLET, FILM COATED ORAL at 07:07

## 2024-03-01 RX ADMIN — ENOXAPARIN SODIUM 90 MG: 100 INJECTION SUBCUTANEOUS at 13:53

## 2024-03-01 RX ADMIN — DIGOXIN 250 MCG: 250 INJECTION, SOLUTION INTRAMUSCULAR; INTRAVENOUS; PARENTERAL at 06:48

## 2024-03-01 RX ADMIN — LETROZOLE 2.5 MG: 2.5 TABLET ORAL at 13:56

## 2024-03-01 RX ADMIN — AMIODARONE HYDROCHLORIDE 0.5 MG/MIN: 50 INJECTION, SOLUTION INTRAVENOUS at 16:21

## 2024-03-01 RX ADMIN — SODIUM CHLORIDE: 9 INJECTION, SOLUTION INTRAVENOUS at 06:42

## 2024-03-01 RX ADMIN — AMIODARONE HYDROCHLORIDE 1 MG/MIN: 50 INJECTION, SOLUTION INTRAVENOUS at 09:11

## 2024-03-01 RX ADMIN — ENOXAPARIN SODIUM 90 MG: 100 INJECTION SUBCUTANEOUS at 20:26

## 2024-03-01 RX ADMIN — METOPROLOL TARTRATE 5 MG: 5 INJECTION INTRAVENOUS at 06:18

## 2024-03-01 RX ADMIN — AMIODARONE HYDROCHLORIDE 150 MG: 50 INJECTION, SOLUTION INTRAVENOUS at 08:57

## 2024-03-01 RX ADMIN — METOPROLOL TARTRATE 5 MG: 5 INJECTION INTRAVENOUS at 06:36

## 2024-03-01 RX ADMIN — DIGOXIN 250 MCG: 0.25 INJECTION INTRAMUSCULAR; INTRAVENOUS at 08:06

## 2024-03-01 ASSESSMENT — PAIN SCALES - GENERAL: PAINLEVEL_OUTOF10: 2

## 2024-03-01 ASSESSMENT — PAIN DESCRIPTION - LOCATION: LOCATION: ABDOMEN

## 2024-03-01 NOTE — SIGNIFICANT EVENT
03/01/24  0438    137 142   K 4.5 4.0 3.8    106 108*   CO2 22 24 26   BUN 22 12 15   CREATININE 0.8 0.6 0.7   GLUCOSE 124* 99 106*   CALCIUM 8.2* 8.4* 8.8       Recent Labs     02/27/24  1031 02/28/24  0429 02/29/24  0429   WBC  --  16.7* 11.4   RBC  --  2.69* 2.60*   HGB 9.9* 8.3* 8.1*   HCT 30.4* 25.2* 24.4*   MCV  --  93.7 93.8   MCH  --  30.9 31.2   MCHC  --  32.9 33.2   RDW  --  15.6* 15.1*   PLT  --   --  160   MPV  --  10.9 11.2       I/O last 3 completed shifts:  In: 780 [P.O.:780]  Out: -   No intake/output data recorded.      Assessment:    Principal Problem:    Acute GI bleeding  Active Problems:    Perforated diverticulum    Atrial fibrillation with RVR ([facility])  Resolved Problems:    * No resolved hospital problems. *      Plan:    AFib RVR  - patient's rhythmol on hold per primary  - change lovenox to therapeutic  - start cardizem bolus followed by drip  - transfer to [facility]      Critical care time 35 minutes not including procedures.    NOTE: This report was transcribed using voice recognition software. Every effort was made to ensure accuracy; however, inadvertent computerized transcription errors may be present.     Electronically signed by Tari Alvarez DO on 3/1/2024 at 5:53 AM

## 2024-03-01 NOTE — CONSULTS
Consult Note    Reason for Consult:  atrial fibrillation with rapid ventricle response    Requesting Physician:  Juan Fox MD    HISTORY OF PRESENT ILLNESS:    The patient is a 78 y.o. female who I saw in the past.    She has problem with paroxysmal atrial fibrillation for which she was kept on Rythmol.    She underwent stent repair of large abdominal aortic aneurysm in 2018 by .  She was told at that time to stay on Plavix and aspirin. She was maintaining sinus rhythm and we did not want to keep her on oral anticoagulation therapy at that time to minimize the risk of bleeding while she is on Plavix especially she did not have any evidence of recurrence of her fibrillation since her initial episode.    She was in sinus rhythm when she came to see me in my office a year ago.    She presented to the hospital this time with what appears to be lower GI bleeding.  It was fairly massive according to the chart.  She had GI workup.  She ended having right hemicolectomy.    Her Rythmol was stopped around the time of surgery.    She went into atrial fibrillation with rapid blood response last night. Patient felt her heart is racing.    Cath a consult was requested.    Patient was still in the surgical floor when I came to see her this morning. Her granddaughter was at bedside.  Her heart rate was around 140/min.    The plan at that time was to move her to cardiac floor so we can start patient on amiodarone drip to restore sinus rhythm quickly.      Past Medical History:   Diagnosis Date    A-fib (HCC)     Arthritis     Cancer (HCC)     BREAST    GERD (gastroesophageal reflux disease)     Osteoporosis     Thoracic aortic aneurysm (HCC)        Past Surgical History:   Procedure Laterality Date    BREAST SURGERY      lumpectomy right breast    COLONOSCOPY      ENDOSCOPY, COLON, DIAGNOSTIC      LAPAROTOMY Right 2/26/2024    EXPLORATORY LAPAROTOMY WITH RIGHT COLON RESECTION POSSIBLE OSTEOTOMY performed by Ruddy

## 2024-03-01 NOTE — CARE COORDINATION
3/1/24 1539 CM note: no covid testing. Transfer from 3rd floor this am for afib rvr- pt started on amio gtt. Room air. Post op day #4 right hemicolectomy. Pt has a cyst on left axilla; plan for cyst excision (was to be today- per pt if still here on Monday they may try to complete or if she discharges over the weekend she will get done as outpt). Discharge plan is home with [facility] for nursing and PT/OT. [facility] order noted. Pts daughter will provide transportation home. CM will follow. Electronically signed by Chey Trevino RN on 3/1/2024 at 4:08 PM

## 2024-03-02 ENCOUNTER — APPOINTMENT (OUTPATIENT)
Age: 79
DRG: 329 | End: 2024-03-02
Attending: SPECIALIST
Payer: MEDICARE

## 2024-03-02 LAB
ANION GAP SERPL CALCULATED.3IONS-SCNC: 11 MMOL/L (ref 7–16)
BASOPHILS # BLD: 0.03 K/UL (ref 0–0.2)
BASOPHILS NFR BLD: 0 % (ref 0–2)
BUN SERPL-MCNC: 9 MG/DL (ref 6–23)
CALCIUM SERPL-MCNC: 8.6 MG/DL (ref 8.6–10.2)
CHLORIDE SERPL-SCNC: 103 MMOL/L (ref 98–107)
CO2 SERPL-SCNC: 24 MMOL/L (ref 22–29)
CREAT SERPL-MCNC: 0.6 MG/DL (ref 0.5–1)
EKG ATRIAL RATE: 150 BPM
EKG Q-T INTERVAL: 254 MS
EKG QRS DURATION: 84 MS
EKG QTC CALCULATION (BAZETT): 439 MS
EKG R AXIS: -9 DEGREES
EKG T AXIS: 177 DEGREES
EKG VENTRICULAR RATE: 180 BPM
EOSINOPHIL # BLD: 0.15 K/UL (ref 0.05–0.5)
EOSINOPHILS RELATIVE PERCENT: 2 % (ref 0–6)
ERYTHROCYTE [DISTWIDTH] IN BLOOD BY AUTOMATED COUNT: 15.5 % (ref 11.5–15)
GFR SERPL CREATININE-BSD FRML MDRD: >60 ML/MIN/1.73M2
GLUCOSE SERPL-MCNC: 159 MG/DL (ref 74–99)
HCT VFR BLD AUTO: 24.1 % (ref 34–48)
HGB BLD-MCNC: 7.6 G/DL (ref 11.5–15.5)
IMM GRANULOCYTES # BLD AUTO: 0.06 K/UL (ref 0–0.58)
IMM GRANULOCYTES NFR BLD: 1 % (ref 0–5)
LYMPHOCYTES NFR BLD: 1.32 K/UL (ref 1.5–4)
LYMPHOCYTES RELATIVE PERCENT: 19 % (ref 20–42)
MAGNESIUM SERPL-MCNC: 1.9 MG/DL (ref 1.6–2.6)
MCH RBC QN AUTO: 29.8 PG (ref 26–35)
MCHC RBC AUTO-ENTMCNC: 31.5 G/DL (ref 32–34.5)
MCV RBC AUTO: 94.5 FL (ref 80–99.9)
MONOCYTES NFR BLD: 0.62 K/UL (ref 0.1–0.95)
MONOCYTES NFR BLD: 9 % (ref 2–12)
NEUTROPHILS NFR BLD: 69 % (ref 43–80)
NEUTS SEG NFR BLD: 4.81 K/UL (ref 1.8–7.3)
PHOSPHATE SERPL-MCNC: 2.7 MG/DL (ref 2.5–4.5)
PLATELET # BLD AUTO: 239 K/UL (ref 130–450)
PMV BLD AUTO: 10.5 FL (ref 7–12)
POTASSIUM SERPL-SCNC: 3.5 MMOL/L (ref 3.5–5)
RBC # BLD AUTO: 2.55 M/UL (ref 3.5–5.5)
SODIUM SERPL-SCNC: 138 MMOL/L (ref 132–146)
TROPONIN I SERPL HS-MCNC: 69 NG/L (ref 0–9)
WBC OTHER # BLD: 7 K/UL (ref 4.5–11.5)

## 2024-03-02 PROCEDURE — 99024 POSTOP FOLLOW-UP VISIT: CPT | Performed by: SURGERY

## 2024-03-02 PROCEDURE — 6370000000 HC RX 637 (ALT 250 FOR IP): Performed by: INTERNAL MEDICINE

## 2024-03-02 PROCEDURE — 6360000002 HC RX W HCPCS: Performed by: INTERNAL MEDICINE

## 2024-03-02 PROCEDURE — 2580000003 HC RX 258: Performed by: SPECIALIST

## 2024-03-02 PROCEDURE — 6360000002 HC RX W HCPCS: Performed by: FAMILY MEDICINE

## 2024-03-02 PROCEDURE — 36415 COLL VENOUS BLD VENIPUNCTURE: CPT

## 2024-03-02 PROCEDURE — 84484 ASSAY OF TROPONIN QUANT: CPT

## 2024-03-02 PROCEDURE — 2060000000 HC ICU INTERMEDIATE R&B

## 2024-03-02 PROCEDURE — 83735 ASSAY OF MAGNESIUM: CPT

## 2024-03-02 PROCEDURE — 93306 TTE W/DOPPLER COMPLETE: CPT

## 2024-03-02 PROCEDURE — 85025 COMPLETE CBC W/AUTO DIFF WBC: CPT

## 2024-03-02 PROCEDURE — 93005 ELECTROCARDIOGRAM TRACING: CPT

## 2024-03-02 PROCEDURE — 6360000002 HC RX W HCPCS: Performed by: SPECIALIST

## 2024-03-02 PROCEDURE — 84100 ASSAY OF PHOSPHORUS: CPT

## 2024-03-02 PROCEDURE — 80048 BASIC METABOLIC PNL TOTAL CA: CPT

## 2024-03-02 RX ADMIN — AMIODARONE HYDROCHLORIDE 0.5 MG/MIN: 50 INJECTION, SOLUTION INTRAVENOUS at 06:28

## 2024-03-02 RX ADMIN — HYALURONIDASE (HUMAN RECOMBINANT) 150 UNITS: 150 INJECTION, SOLUTION SUBCUTANEOUS at 12:05

## 2024-03-02 RX ADMIN — ENOXAPARIN SODIUM 90 MG: 100 INJECTION SUBCUTANEOUS at 20:53

## 2024-03-02 RX ADMIN — LETROZOLE 2.5 MG: 2.5 TABLET ORAL at 08:58

## 2024-03-02 RX ADMIN — PROPAFENONE HYDROCHLORIDE 150 MG: 150 TABLET, FILM COATED ORAL at 23:38

## 2024-03-02 RX ADMIN — PROPAFENONE HYDROCHLORIDE 150 MG: 150 TABLET, FILM COATED ORAL at 15:20

## 2024-03-02 RX ADMIN — ENOXAPARIN SODIUM 90 MG: 100 INJECTION SUBCUTANEOUS at 08:58

## 2024-03-02 ASSESSMENT — PAIN SCALES - GENERAL: PAINLEVEL_OUTOF10: 0

## 2024-03-03 LAB
ANION GAP SERPL CALCULATED.3IONS-SCNC: 8 MMOL/L (ref 7–16)
BASOPHILS # BLD: 0.03 K/UL (ref 0–0.2)
BASOPHILS NFR BLD: 1 % (ref 0–2)
BUN SERPL-MCNC: 7 MG/DL (ref 6–23)
CALCIUM SERPL-MCNC: 8.6 MG/DL (ref 8.6–10.2)
CHLORIDE SERPL-SCNC: 106 MMOL/L (ref 98–107)
CO2 SERPL-SCNC: 25 MMOL/L (ref 22–29)
CREAT SERPL-MCNC: 0.6 MG/DL (ref 0.5–1)
EKG ATRIAL RATE: 64 BPM
EKG Q-T INTERVAL: 432 MS
EKG QRS DURATION: 88 MS
EKG QTC CALCULATION (BAZETT): 459 MS
EKG R AXIS: -13 DEGREES
EKG T AXIS: -27 DEGREES
EKG VENTRICULAR RATE: 68 BPM
EOSINOPHIL # BLD: 0.24 K/UL (ref 0.05–0.5)
EOSINOPHILS RELATIVE PERCENT: 4 % (ref 0–6)
ERYTHROCYTE [DISTWIDTH] IN BLOOD BY AUTOMATED COUNT: 15.1 % (ref 11.5–15)
GFR SERPL CREATININE-BSD FRML MDRD: >60 ML/MIN/1.73M2
GLUCOSE SERPL-MCNC: 92 MG/DL (ref 74–99)
HCT VFR BLD AUTO: 24 % (ref 34–48)
HGB BLD-MCNC: 7.9 G/DL (ref 11.5–15.5)
IMM GRANULOCYTES # BLD AUTO: 0.05 K/UL (ref 0–0.58)
IMM GRANULOCYTES NFR BLD: 1 % (ref 0–5)
LYMPHOCYTES NFR BLD: 0.82 K/UL (ref 1.5–4)
LYMPHOCYTES RELATIVE PERCENT: 13 % (ref 20–42)
MAGNESIUM SERPL-MCNC: 1.9 MG/DL (ref 1.6–2.6)
MCH RBC QN AUTO: 30.7 PG (ref 26–35)
MCHC RBC AUTO-ENTMCNC: 32.9 G/DL (ref 32–34.5)
MCV RBC AUTO: 93.4 FL (ref 80–99.9)
MONOCYTES NFR BLD: 0.53 K/UL (ref 0.1–0.95)
MONOCYTES NFR BLD: 8 % (ref 2–12)
NEUTROPHILS NFR BLD: 74 % (ref 43–80)
NEUTS SEG NFR BLD: 4.63 K/UL (ref 1.8–7.3)
PHOSPHATE SERPL-MCNC: 3.3 MG/DL (ref 2.5–4.5)
PLATELET # BLD AUTO: 271 K/UL (ref 130–450)
PMV BLD AUTO: 9.8 FL (ref 7–12)
POTASSIUM SERPL-SCNC: 3.5 MMOL/L (ref 3.5–5)
RBC # BLD AUTO: 2.57 M/UL (ref 3.5–5.5)
SODIUM SERPL-SCNC: 139 MMOL/L (ref 132–146)
WBC OTHER # BLD: 6.3 K/UL (ref 4.5–11.5)

## 2024-03-03 PROCEDURE — 36415 COLL VENOUS BLD VENIPUNCTURE: CPT

## 2024-03-03 PROCEDURE — 2060000000 HC ICU INTERMEDIATE R&B

## 2024-03-03 PROCEDURE — 85025 COMPLETE CBC W/AUTO DIFF WBC: CPT

## 2024-03-03 PROCEDURE — 83735 ASSAY OF MAGNESIUM: CPT

## 2024-03-03 PROCEDURE — 84100 ASSAY OF PHOSPHORUS: CPT

## 2024-03-03 PROCEDURE — 6370000000 HC RX 637 (ALT 250 FOR IP): Performed by: INTERNAL MEDICINE

## 2024-03-03 PROCEDURE — 80048 BASIC METABOLIC PNL TOTAL CA: CPT

## 2024-03-03 PROCEDURE — 97110 THERAPEUTIC EXERCISES: CPT

## 2024-03-03 PROCEDURE — 6360000002 HC RX W HCPCS: Performed by: FAMILY MEDICINE

## 2024-03-03 PROCEDURE — 99024 POSTOP FOLLOW-UP VISIT: CPT | Performed by: SURGERY

## 2024-03-03 RX ADMIN — PROPAFENONE HYDROCHLORIDE 150 MG: 150 TABLET, FILM COATED ORAL at 16:43

## 2024-03-03 RX ADMIN — PROPAFENONE HYDROCHLORIDE 150 MG: 150 TABLET, FILM COATED ORAL at 23:51

## 2024-03-03 RX ADMIN — ENOXAPARIN SODIUM 90 MG: 100 INJECTION SUBCUTANEOUS at 08:49

## 2024-03-03 RX ADMIN — PROPAFENONE HYDROCHLORIDE 150 MG: 150 TABLET, FILM COATED ORAL at 05:51

## 2024-03-03 RX ADMIN — LETROZOLE 2.5 MG: 2.5 TABLET ORAL at 08:51

## 2024-03-03 NOTE — CONSULTS
CARDIOLOGY CONSULTATION    Patient Name:  Ana Garcia    :  1945    Reason for Consultation:   Atrial fibrillation    History of Present Illness:   Ana Garcia presents to [facility]  - Recently underwent colon surgery and developed atrial fibrillation having been previously on Rythmol which was discontinued.  Patient was subsequently started on IV amiodarone and converted chemically converted to normal sinus rhythm. IV infiltrated  with  Hyaluronidase  which was infiltrated at the IV site.This was discontinued and Rythmol was resumed by attending staff.  Patient states she is scheduled for discharge    Past Medical History:   has a past medical history of A-fib (HCC), Arthritis, Cancer (HCC), GERD (gastroesophageal reflux disease), Osteoporosis, and Thoracic aortic aneurysm (HCC).    Surgical History:   has a past surgical history that includes Breast surgery; Colonoscopy; Endoscopy, colon, diagnostic; vascular surgery; Total knee arthroplasty (Left, 3/14/2023); and laparotomy (Right, 2024).     Social History:   reports that she has never smoked. She has never used smokeless tobacco. She reports that she does not currently use alcohol. She reports that she does not use drugs.     Family History:  family history is not on file.     Medications:  Prior to Admission medications    Medication Sig Start Date End Date Taking? Authorizing Provider   propafenone (RYTHMOL) 150 MG tablet Take 1 tablet by mouth in the morning and 1 tablet at noon and 1 tablet in the evening. 23   Ora Stanton MD   omeprazole (PRILOSEC) 20 MG delayed release capsule Take 1 capsule by mouth 2 times daily as needed 22   Ora Stanton MD   letrozole (FEMARA) 2.5 MG tablet TAKE 1 TABLET BY MOUTH DAILY 22   Ora Stanton MD   clopidogrel (PLAVIX) 75 MG tablet Take 1 tablet by mouth daily 2/3/23   Ora Stanton MD   aspirin 81 MG EC tablet Take 1 tablet by mouth daily    Maximino

## 2024-03-04 ENCOUNTER — ANESTHESIA (OUTPATIENT)
Dept: OPERATING ROOM | Age: 79
DRG: 329 | End: 2024-03-04
Payer: MEDICARE

## 2024-03-04 ENCOUNTER — ANESTHESIA EVENT (OUTPATIENT)
Dept: OPERATING ROOM | Age: 79
DRG: 329 | End: 2024-03-04
Payer: MEDICARE

## 2024-03-04 DIAGNOSIS — Z96.652 S/P TOTAL KNEE ARTHROPLASTY, LEFT: Primary | ICD-10-CM

## 2024-03-04 LAB
ANION GAP SERPL CALCULATED.3IONS-SCNC: 14 MMOL/L (ref 7–16)
BASOPHILS # BLD: 0.02 K/UL (ref 0–0.2)
BASOPHILS NFR BLD: 0 % (ref 0–2)
BUN SERPL-MCNC: 9 MG/DL (ref 6–23)
CALCIUM SERPL-MCNC: 9 MG/DL (ref 8.6–10.2)
CHLORIDE SERPL-SCNC: 106 MMOL/L (ref 98–107)
CO2 SERPL-SCNC: 23 MMOL/L (ref 22–29)
CREAT SERPL-MCNC: 0.7 MG/DL (ref 0.5–1)
EOSINOPHIL # BLD: 0.22 K/UL (ref 0.05–0.5)
EOSINOPHILS RELATIVE PERCENT: 4 % (ref 0–6)
ERYTHROCYTE [DISTWIDTH] IN BLOOD BY AUTOMATED COUNT: 15.5 % (ref 11.5–15)
GFR SERPL CREATININE-BSD FRML MDRD: >60 ML/MIN/1.73M2
GLUCOSE SERPL-MCNC: 92 MG/DL (ref 74–99)
HCT VFR BLD AUTO: 24 % (ref 34–48)
HGB BLD-MCNC: 7.7 G/DL (ref 11.5–15.5)
IMM GRANULOCYTES # BLD AUTO: 0.08 K/UL (ref 0–0.58)
IMM GRANULOCYTES NFR BLD: 1 % (ref 0–5)
LYMPHOCYTES NFR BLD: 1.08 K/UL (ref 1.5–4)
LYMPHOCYTES RELATIVE PERCENT: 17 % (ref 20–42)
MAGNESIUM SERPL-MCNC: 1.9 MG/DL (ref 1.6–2.6)
MCH RBC QN AUTO: 30.2 PG (ref 26–35)
MCHC RBC AUTO-ENTMCNC: 32.1 G/DL (ref 32–34.5)
MCV RBC AUTO: 94.1 FL (ref 80–99.9)
MONOCYTES NFR BLD: 0.51 K/UL (ref 0.1–0.95)
MONOCYTES NFR BLD: 8 % (ref 2–12)
NEUTROPHILS NFR BLD: 70 % (ref 43–80)
NEUTS SEG NFR BLD: 4.46 K/UL (ref 1.8–7.3)
PHOSPHATE SERPL-MCNC: 3.6 MG/DL (ref 2.5–4.5)
PLATELET # BLD AUTO: 308 K/UL (ref 130–450)
PMV BLD AUTO: 10.4 FL (ref 7–12)
POTASSIUM SERPL-SCNC: 3.5 MMOL/L (ref 3.5–5)
RBC # BLD AUTO: 2.55 M/UL (ref 3.5–5.5)
SODIUM SERPL-SCNC: 143 MMOL/L (ref 132–146)
SURGICAL PATHOLOGY REPORT: NORMAL
WBC OTHER # BLD: 6.4 K/UL (ref 4.5–11.5)

## 2024-03-04 PROCEDURE — 6370000000 HC RX 637 (ALT 250 FOR IP): Performed by: INTERNAL MEDICINE

## 2024-03-04 PROCEDURE — 3700000001 HC ADD 15 MINUTES (ANESTHESIA): Performed by: SURGERY

## 2024-03-04 PROCEDURE — 6370000000 HC RX 637 (ALT 250 FOR IP): Performed by: SURGERY

## 2024-03-04 PROCEDURE — 7100000000 HC PACU RECOVERY - FIRST 15 MIN: Performed by: SURGERY

## 2024-03-04 PROCEDURE — 2580000003 HC RX 258: Performed by: NURSE ANESTHETIST, CERTIFIED REGISTERED

## 2024-03-04 PROCEDURE — 2060000000 HC ICU INTERMEDIATE R&B

## 2024-03-04 PROCEDURE — 2500000003 HC RX 250 WO HCPCS: Performed by: SURGERY

## 2024-03-04 PROCEDURE — 2709999900 HC NON-CHARGEABLE SUPPLY: Performed by: SURGERY

## 2024-03-04 PROCEDURE — 3600000002 HC SURGERY LEVEL 2 BASE: Performed by: SURGERY

## 2024-03-04 PROCEDURE — 6360000002 HC RX W HCPCS: Performed by: NURSE ANESTHETIST, CERTIFIED REGISTERED

## 2024-03-04 PROCEDURE — 88304 TISSUE EXAM BY PATHOLOGIST: CPT

## 2024-03-04 PROCEDURE — 07B60ZZ EXCISION OF LEFT AXILLARY LYMPHATIC, OPEN APPROACH: ICD-10-PCS | Performed by: SURGERY

## 2024-03-04 PROCEDURE — 83735 ASSAY OF MAGNESIUM: CPT

## 2024-03-04 PROCEDURE — 85025 COMPLETE CBC W/AUTO DIFF WBC: CPT

## 2024-03-04 PROCEDURE — 84100 ASSAY OF PHOSPHORUS: CPT

## 2024-03-04 PROCEDURE — 3600000012 HC SURGERY LEVEL 2 ADDTL 15MIN: Performed by: SURGERY

## 2024-03-04 PROCEDURE — 7100000001 HC PACU RECOVERY - ADDTL 15 MIN: Performed by: SURGERY

## 2024-03-04 PROCEDURE — 3700000000 HC ANESTHESIA ATTENDED CARE: Performed by: SURGERY

## 2024-03-04 PROCEDURE — 36415 COLL VENOUS BLD VENIPUNCTURE: CPT

## 2024-03-04 PROCEDURE — 80048 BASIC METABOLIC PNL TOTAL CA: CPT

## 2024-03-04 RX ORDER — PROCHLORPERAZINE EDISYLATE 5 MG/ML
5 INJECTION INTRAMUSCULAR; INTRAVENOUS
Status: DISCONTINUED | OUTPATIENT
Start: 2024-03-04 | End: 2024-03-04 | Stop reason: HOSPADM

## 2024-03-04 RX ORDER — FENTANYL CITRATE 50 UG/ML
INJECTION, SOLUTION INTRAMUSCULAR; INTRAVENOUS PRN
Status: DISCONTINUED | OUTPATIENT
Start: 2024-03-04 | End: 2024-03-04 | Stop reason: SDUPTHER

## 2024-03-04 RX ORDER — MEPERIDINE HYDROCHLORIDE 25 MG/ML
12.5 INJECTION INTRAMUSCULAR; INTRAVENOUS; SUBCUTANEOUS EVERY 5 MIN PRN
Status: DISCONTINUED | OUTPATIENT
Start: 2024-03-04 | End: 2024-03-04 | Stop reason: HOSPADM

## 2024-03-04 RX ORDER — SODIUM CHLORIDE 9 MG/ML
INJECTION, SOLUTION INTRAVENOUS CONTINUOUS PRN
Status: DISCONTINUED | OUTPATIENT
Start: 2024-03-04 | End: 2024-03-04 | Stop reason: SDUPTHER

## 2024-03-04 RX ORDER — HALOPERIDOL 5 MG/ML
1 INJECTION INTRAMUSCULAR
Status: DISCONTINUED | OUTPATIENT
Start: 2024-03-04 | End: 2024-03-04 | Stop reason: HOSPADM

## 2024-03-04 RX ORDER — LIDOCAINE HYDROCHLORIDE AND EPINEPHRINE 5; 5 MG/ML; UG/ML
INJECTION, SOLUTION INFILTRATION; PERINEURAL PRN
Status: DISCONTINUED | OUTPATIENT
Start: 2024-03-04 | End: 2024-03-04 | Stop reason: ALTCHOICE

## 2024-03-04 RX ORDER — SODIUM CHLORIDE 0.9 % (FLUSH) 0.9 %
5-40 SYRINGE (ML) INJECTION PRN
Status: DISCONTINUED | OUTPATIENT
Start: 2024-03-04 | End: 2024-03-04 | Stop reason: HOSPADM

## 2024-03-04 RX ORDER — HYDROMORPHONE HYDROCHLORIDE 1 MG/ML
0.25 INJECTION, SOLUTION INTRAMUSCULAR; INTRAVENOUS; SUBCUTANEOUS EVERY 5 MIN PRN
Status: DISCONTINUED | OUTPATIENT
Start: 2024-03-04 | End: 2024-03-04 | Stop reason: HOSPADM

## 2024-03-04 RX ORDER — HYDRALAZINE HYDROCHLORIDE 20 MG/ML
10 INJECTION INTRAMUSCULAR; INTRAVENOUS
Status: DISCONTINUED | OUTPATIENT
Start: 2024-03-04 | End: 2024-03-04 | Stop reason: HOSPADM

## 2024-03-04 RX ORDER — PROPOFOL 10 MG/ML
INJECTION, EMULSION INTRAVENOUS PRN
Status: DISCONTINUED | OUTPATIENT
Start: 2024-03-04 | End: 2024-03-04 | Stop reason: SDUPTHER

## 2024-03-04 RX ORDER — SODIUM CHLORIDE 0.9 % (FLUSH) 0.9 %
5-40 SYRINGE (ML) INJECTION EVERY 12 HOURS SCHEDULED
Status: DISCONTINUED | OUTPATIENT
Start: 2024-03-04 | End: 2024-03-04 | Stop reason: HOSPADM

## 2024-03-04 RX ORDER — CEFAZOLIN 2 G/1
INJECTION, POWDER, FOR SOLUTION INTRAMUSCULAR; INTRAVENOUS PRN
Status: DISCONTINUED | OUTPATIENT
Start: 2024-03-04 | End: 2024-03-04 | Stop reason: SDUPTHER

## 2024-03-04 RX ORDER — LIDOCAINE HYDROCHLORIDE 20 MG/ML
INJECTION, SOLUTION INTRAVENOUS PRN
Status: DISCONTINUED | OUTPATIENT
Start: 2024-03-04 | End: 2024-03-04 | Stop reason: SDUPTHER

## 2024-03-04 RX ORDER — SODIUM CHLORIDE 9 MG/ML
INJECTION, SOLUTION INTRAVENOUS PRN
Status: DISCONTINUED | OUTPATIENT
Start: 2024-03-04 | End: 2024-03-04 | Stop reason: HOSPADM

## 2024-03-04 RX ORDER — IPRATROPIUM BROMIDE AND ALBUTEROL SULFATE 2.5; .5 MG/3ML; MG/3ML
1 SOLUTION RESPIRATORY (INHALATION)
Status: DISCONTINUED | OUTPATIENT
Start: 2024-03-04 | End: 2024-03-04 | Stop reason: HOSPADM

## 2024-03-04 RX ORDER — KETOROLAC TROMETHAMINE 15 MG/ML
15 INJECTION, SOLUTION INTRAMUSCULAR; INTRAVENOUS ONCE
Status: DISCONTINUED | OUTPATIENT
Start: 2024-03-04 | End: 2024-03-04 | Stop reason: HOSPADM

## 2024-03-04 RX ORDER — LABETALOL HYDROCHLORIDE 5 MG/ML
10 INJECTION, SOLUTION INTRAVENOUS
Status: DISCONTINUED | OUTPATIENT
Start: 2024-03-04 | End: 2024-03-04 | Stop reason: HOSPADM

## 2024-03-04 RX ORDER — DIPHENHYDRAMINE HYDROCHLORIDE 50 MG/ML
12.5 INJECTION INTRAMUSCULAR; INTRAVENOUS
Status: DISCONTINUED | OUTPATIENT
Start: 2024-03-04 | End: 2024-03-04 | Stop reason: HOSPADM

## 2024-03-04 RX ADMIN — PROPOFOL 25 MG: 10 INJECTION, EMULSION INTRAVENOUS at 10:45

## 2024-03-04 RX ADMIN — FENTANYL CITRATE 25 MCG: 50 INJECTION, SOLUTION INTRAMUSCULAR; INTRAVENOUS at 10:42

## 2024-03-04 RX ADMIN — PROPAFENONE HYDROCHLORIDE 150 MG: 150 TABLET, FILM COATED ORAL at 15:13

## 2024-03-04 RX ADMIN — SODIUM CHLORIDE: 900 INJECTION, SOLUTION INTRAVENOUS at 10:34

## 2024-03-04 RX ADMIN — PROPOFOL 25 MG: 10 INJECTION, EMULSION INTRAVENOUS at 10:48

## 2024-03-04 RX ADMIN — CEFAZOLIN 2 G: 2 INJECTION, POWDER, FOR SOLUTION INTRAMUSCULAR; INTRAVENOUS at 10:44

## 2024-03-04 RX ADMIN — PROPAFENONE HYDROCHLORIDE 150 MG: 150 TABLET, FILM COATED ORAL at 23:16

## 2024-03-04 RX ADMIN — PROPAFENONE HYDROCHLORIDE 150 MG: 150 TABLET, FILM COATED ORAL at 06:05

## 2024-03-04 RX ADMIN — FENTANYL CITRATE 25 MCG: 50 INJECTION, SOLUTION INTRAMUSCULAR; INTRAVENOUS at 10:47

## 2024-03-04 RX ADMIN — PROPOFOL 75 MG: 10 INJECTION, EMULSION INTRAVENOUS at 10:42

## 2024-03-04 RX ADMIN — LIDOCAINE HYDROCHLORIDE 50 MG: 20 INJECTION, SOLUTION INTRAVENOUS at 10:48

## 2024-03-04 RX ADMIN — LIDOCAINE HYDROCHLORIDE 50 MG: 20 INJECTION, SOLUTION INTRAVENOUS at 10:42

## 2024-03-04 ASSESSMENT — PAIN SCALES - GENERAL: PAINLEVEL_OUTOF10: 0

## 2024-03-04 NOTE — CARE COORDINATION
3/4/24 1622 CM note: no covid testing. Room air. Transferred from the 3rd floor on Friday for afib rvr. Post op day #7 right hemicolectomy. Pt had excision left axilla cyst today. Discharge plan is home with [facility] for nursing and PT/OT. [facility] order noted. Pts daughter will provide transportation home. CM will follow. Electronically signed by Chey Trevino RN on 3/4/2024 at 4:26 PM

## 2024-03-04 NOTE — PLAN OF CARE
Problem: Discharge Planning  Goal: Discharge to home or other facility with appropriate resources  Outcome: Progressing  Flowsheets (Taken 3/3/2024 2000)  Discharge to home or other facility with appropriate resources: Identify barriers to discharge with patient and caregiver     Problem: Pain  Goal: Verbalizes/displays adequate comfort level or baseline comfort level  Outcome: Progressing     Problem: Safety - Adult  Goal: Free from fall injury  Outcome: Progressing     Problem: ABCDS Injury Assessment  Goal: Absence of physical injury  Outcome: Progressing

## 2024-03-04 NOTE — ANESTHESIA PRE PROCEDURE
discussed with patient.      Plan discussed with CRNA.                    Pt seen and examined, chart reviewed (including anesthesia, drug and allergy history).  No interval changes to history and physical examination. (except as noted above).      Anesthetic plan, risks, benefits, alternatives, and personnel involved discussed with patient.  Patient verbalized an understanding and agrees to proceed.  Plan discussed with care team members and agreed upon.    Tono Aparicio MD   3/4/2024

## 2024-03-04 NOTE — ANESTHESIA POSTPROCEDURE EVALUATION
Department of Anesthesiology  Postprocedure Note    Patient: Ana Garcia  MRN: [id number]  YOB: 1945  Date of evaluation: 3/4/2024    Procedure Summary       Date: 03/04/24 Room / Location: [address]    Anesthesia Start: 1034 Anesthesia Stop: 1106    Procedure: EXCISION LEFT AXILLARY CYST (Left: Axilla) Diagnosis:       Sebaceous cyst of left axilla      (Sebaceous cyst of left axilla [L72.3])    Surgeons: Juan Fox MD Responsible Provider: Tono Aparicio MD    Anesthesia Type: MAC ASA Status: 3            Anesthesia Type: No value filed.    Yossi Phase I: Yossi Score: 10    Yossi Phase II:      Anesthesia Post Evaluation    Patient location during evaluation: PACU  Patient participation: complete - patient participated  Level of consciousness: awake  Pain score: 0  Airway patency: patent  Nausea & Vomiting: no nausea and no vomiting  Cardiovascular status: hemodynamically stable  Respiratory status: acceptable  Hydration status: euvolemic  Pain management: adequate        No notable events documented.

## 2024-03-04 NOTE — OP NOTE
DATE OF PROCEDURE: 3/4/2024   SURGEON: AVTAR FREDERICK M.D.   ASSISTANT: fannie.   PREOPERATIVE DIAGNOSIS: painful left axillary cyst, 4 cm in size.   POSTOPERATIVE DIAGNOSIS: same.   OPERATION: Excision of painful left axillary cyst, 4 cm in size..   ANESTHESIA: LMAC and local.   ESTIMATED BLOOD LOSS: Minimal.   COMPLICATIONS: None.   FLUIDS: Crystalloid.   DISPOSITION: Discharged home.   SPECIMEN: cyst.   PROCEDURE: The patient was brought into the operative suite and was placed   under LMAC anesthesia; was infused with local anesthetic after being prepped   and draped in a normal sterile condition.   Once this was done, approximately a 2x6-cm incision was made in the left axilla. Once this was done, the incision was carried down through the   dermis with electrocautery. This was then delivered and sent for   specimen.   The wound was sterilely irrigated, and electrocautery was used to obtain   hemostasis. Once this was done, deep dermal stitches were placed with a 3-0   Vicryl suture, and a running 4-0 Vicryl suture was used in a subcuticular   fashion to approximate the skin. Finally, Dermabond was placed, and the   patient was woken up in stable condition and taken to PACU.

## 2024-03-05 LAB
ANION GAP SERPL CALCULATED.3IONS-SCNC: 12 MMOL/L (ref 7–16)
BASOPHILS # BLD: 0.02 K/UL (ref 0–0.2)
BASOPHILS NFR BLD: 0 % (ref 0–2)
BUN SERPL-MCNC: 9 MG/DL (ref 6–23)
CALCIUM SERPL-MCNC: 8.9 MG/DL (ref 8.6–10.2)
CHLORIDE SERPL-SCNC: 107 MMOL/L (ref 98–107)
CO2 SERPL-SCNC: 25 MMOL/L (ref 22–29)
CREAT SERPL-MCNC: 0.7 MG/DL (ref 0.5–1)
ECHO AV AREA PEAK VELOCITY: 2.5 CM2
ECHO AV AREA VTI: 2.6 CM2
ECHO AV AREA/BSA PEAK VELOCITY: 1.3 CM2/M2
ECHO AV AREA/BSA VTI: 1.3 CM2/M2
ECHO AV CUSP MM: 2.1 CM
ECHO AV MEAN GRADIENT: 6 MMHG
ECHO AV MEAN VELOCITY: 1.1 M/S
ECHO AV PEAK GRADIENT: 12 MMHG
ECHO AV PEAK VELOCITY: 1.8 M/S
ECHO AV VELOCITY RATIO: 0.61
ECHO AV VTI: 32.3 CM
ECHO BSA: 2.06 M2
ECHO LA VOL A-L A2C: 36 ML (ref 22–52)
ECHO LA VOL A-L A4C: 55 ML (ref 22–52)
ECHO LA VOL MOD A2C: 32 ML (ref 22–52)
ECHO LA VOL MOD A4C: 43 ML (ref 22–52)
ECHO LA VOLUME AREA LENGTH: 55 ML
ECHO LA VOLUME INDEX A-L A2C: 18 ML/M2 (ref 16–34)
ECHO LA VOLUME INDEX A-L A4C: 28 ML/M2 (ref 16–34)
ECHO LA VOLUME INDEX AREA LENGTH: 28 ML/M2 (ref 16–34)
ECHO LA VOLUME INDEX MOD A2C: 16 ML/M2 (ref 16–34)
ECHO LA VOLUME INDEX MOD A4C: 22 ML/M2 (ref 16–34)
ECHO LV EDV A2C: 95 ML
ECHO LV EDV A4C: 58 ML
ECHO LV EDV BP: 79 ML (ref 56–104)
ECHO LV EDV INDEX A4C: 29 ML/M2
ECHO LV EDV INDEX BP: 40 ML/M2
ECHO LV EDV NDEX A2C: 48 ML/M2
ECHO LV EJECTION FRACTION A2C: 64 %
ECHO LV EJECTION FRACTION A4C: 56 %
ECHO LV EJECTION FRACTION BIPLANE: 62 % (ref 55–100)
ECHO LV ESV A2C: 35 ML
ECHO LV ESV A4C: 26 ML
ECHO LV ESV BP: 30 ML (ref 19–49)
ECHO LV ESV INDEX A2C: 18 ML/M2
ECHO LV ESV INDEX A4C: 13 ML/M2
ECHO LV ESV INDEX BP: 15 ML/M2
ECHO LV FRACTIONAL SHORTENING: 29 % (ref 28–44)
ECHO LV INTERNAL DIMENSION DIASTOLE INDEX: 2.55 CM/M2
ECHO LV INTERNAL DIMENSION DIASTOLIC: 5.1 CM (ref 3.9–5.3)
ECHO LV INTERNAL DIMENSION SYSTOLIC INDEX: 1.8 CM/M2
ECHO LV INTERNAL DIMENSION SYSTOLIC: 3.6 CM
ECHO LV ISOVOLUMETRIC RELAXATION TIME (IVRT): 110.7 MS
ECHO LV IVSD: 1 CM (ref 0.6–0.9)
ECHO LV MASS 2D: 213.9 G (ref 67–162)
ECHO LV MASS INDEX 2D: 106.9 G/M2 (ref 43–95)
ECHO LV POSTERIOR WALL DIASTOLIC: 1.2 CM (ref 0.6–0.9)
ECHO LV RELATIVE WALL THICKNESS RATIO: 0.47
ECHO LVOT AREA: 4.2 CM2
ECHO LVOT AV VTI INDEX: 0.63
ECHO LVOT DIAM: 2.3 CM
ECHO LVOT MEAN GRADIENT: 2 MMHG
ECHO LVOT PEAK GRADIENT: 5 MMHG
ECHO LVOT PEAK VELOCITY: 1.1 M/S
ECHO LVOT STROKE VOLUME INDEX: 41.9 ML/M2
ECHO LVOT SV: 83.9 ML
ECHO LVOT VTI: 20.2 CM
ECHO MV A VELOCITY: 0.83 M/S
ECHO MV AREA PHT: 2.5 CM2
ECHO MV AREA VTI: 3.1 CM2
ECHO MV E DECELERATION TIME (DT): 330.6 MS
ECHO MV E VELOCITY: 0.6 M/S
ECHO MV E/A RATIO: 0.72
ECHO MV LVOT VTI INDEX: 1.34
ECHO MV MAX VELOCITY: 0.9 M/S
ECHO MV MEAN GRADIENT: 1 MMHG
ECHO MV MEAN VELOCITY: 0.5 M/S
ECHO MV PEAK GRADIENT: 3 MMHG
ECHO MV PRESSURE HALF TIME (PHT): 88.2 MS
ECHO MV VTI: 27.1 CM
ECHO PV MAX VELOCITY: 0.8 M/S
ECHO PV PEAK GRADIENT: 3 MMHG
ECHO RV INTERNAL DIMENSION: 2.2 CM
ECHO TV REGURGITANT MAX VELOCITY: 2.41 M/S
ECHO TV REGURGITANT PEAK GRADIENT: 23 MMHG
EOSINOPHIL # BLD: 0.19 K/UL (ref 0.05–0.5)
EOSINOPHILS RELATIVE PERCENT: 3 % (ref 0–6)
ERYTHROCYTE [DISTWIDTH] IN BLOOD BY AUTOMATED COUNT: 15.5 % (ref 11.5–15)
GFR SERPL CREATININE-BSD FRML MDRD: >60 ML/MIN/1.73M2
GLUCOSE SERPL-MCNC: 99 MG/DL (ref 74–99)
HCT VFR BLD AUTO: 25.9 % (ref 34–48)
HGB BLD-MCNC: 8 G/DL (ref 11.5–15.5)
IMM GRANULOCYTES # BLD AUTO: 0.06 K/UL (ref 0–0.58)
IMM GRANULOCYTES NFR BLD: 1 % (ref 0–5)
LYMPHOCYTES NFR BLD: 0.85 K/UL (ref 1.5–4)
LYMPHOCYTES RELATIVE PERCENT: 13 % (ref 20–42)
MAGNESIUM SERPL-MCNC: 2 MG/DL (ref 1.6–2.6)
MCH RBC QN AUTO: 29.6 PG (ref 26–35)
MCHC RBC AUTO-ENTMCNC: 30.9 G/DL (ref 32–34.5)
MCV RBC AUTO: 95.9 FL (ref 80–99.9)
MONOCYTES NFR BLD: 0.49 K/UL (ref 0.1–0.95)
MONOCYTES NFR BLD: 8 % (ref 2–12)
NEUTROPHILS NFR BLD: 75 % (ref 43–80)
NEUTS SEG NFR BLD: 4.83 K/UL (ref 1.8–7.3)
PHOSPHATE SERPL-MCNC: 3.5 MG/DL (ref 2.5–4.5)
PLATELET # BLD AUTO: 339 K/UL (ref 130–450)
PMV BLD AUTO: 10.2 FL (ref 7–12)
POTASSIUM SERPL-SCNC: 4 MMOL/L (ref 3.5–5)
RBC # BLD AUTO: 2.7 M/UL (ref 3.5–5.5)
SODIUM SERPL-SCNC: 144 MMOL/L (ref 132–146)
WBC OTHER # BLD: 6.4 K/UL (ref 4.5–11.5)

## 2024-03-05 PROCEDURE — 97110 THERAPEUTIC EXERCISES: CPT

## 2024-03-05 PROCEDURE — 84100 ASSAY OF PHOSPHORUS: CPT

## 2024-03-05 PROCEDURE — 36415 COLL VENOUS BLD VENIPUNCTURE: CPT

## 2024-03-05 PROCEDURE — 6370000000 HC RX 637 (ALT 250 FOR IP): Performed by: INTERNAL MEDICINE

## 2024-03-05 PROCEDURE — 80048 BASIC METABOLIC PNL TOTAL CA: CPT

## 2024-03-05 PROCEDURE — 97530 THERAPEUTIC ACTIVITIES: CPT

## 2024-03-05 PROCEDURE — 6370000000 HC RX 637 (ALT 250 FOR IP): Performed by: SURGERY

## 2024-03-05 PROCEDURE — 83735 ASSAY OF MAGNESIUM: CPT

## 2024-03-05 PROCEDURE — 2060000000 HC ICU INTERMEDIATE R&B

## 2024-03-05 PROCEDURE — 85025 COMPLETE CBC W/AUTO DIFF WBC: CPT

## 2024-03-05 PROCEDURE — 97116 GAIT TRAINING THERAPY: CPT

## 2024-03-05 RX ORDER — ASPIRIN 81 MG/1
81 TABLET, CHEWABLE ORAL DAILY
Status: DISCONTINUED | OUTPATIENT
Start: 2024-03-05 | End: 2024-03-06 | Stop reason: HOSPADM

## 2024-03-05 RX ADMIN — PROPAFENONE HYDROCHLORIDE 150 MG: 150 TABLET, FILM COATED ORAL at 23:01

## 2024-03-05 RX ADMIN — PROPAFENONE HYDROCHLORIDE 150 MG: 150 TABLET, FILM COATED ORAL at 15:50

## 2024-03-05 RX ADMIN — PROPAFENONE HYDROCHLORIDE 150 MG: 150 TABLET, FILM COATED ORAL at 06:20

## 2024-03-05 RX ADMIN — ASPIRIN 81 MG: 81 TABLET, CHEWABLE ORAL at 11:46

## 2024-03-05 RX ADMIN — LETROZOLE 2.5 MG: 2.5 TABLET ORAL at 09:41

## 2024-03-05 ASSESSMENT — PAIN SCALES - GENERAL
PAINLEVEL_OUTOF10: 0

## 2024-03-05 NOTE — CARE COORDINATION
3/5/24 1703 CM note: no covid testing. Room air. Post op day #8 right hemicolectomy. Pt had excision left axilla cyst 3/4/24. Pt started on eliquis today.  Discharge plan is home with [facility] for nursing and PT/OT. [facility] order noted. Pts daughter will provide transportation home. CM will follow. Electronically signed by Chey Trevino RN on 3/5/2024 at 5:06 PM

## 2024-03-06 VITALS
WEIGHT: 200 LBS | HEIGHT: 66 IN | DIASTOLIC BLOOD PRESSURE: 65 MMHG | OXYGEN SATURATION: 95 % | TEMPERATURE: 99.4 F | HEART RATE: 80 BPM | BODY MASS INDEX: 32.14 KG/M2 | RESPIRATION RATE: 15 BRPM | SYSTOLIC BLOOD PRESSURE: 137 MMHG

## 2024-03-06 LAB
ANION GAP SERPL CALCULATED.3IONS-SCNC: 11 MMOL/L (ref 7–16)
BASOPHILS # BLD: 0.02 K/UL (ref 0–0.2)
BASOPHILS NFR BLD: 0 % (ref 0–2)
BUN SERPL-MCNC: 8 MG/DL (ref 6–23)
CALCIUM SERPL-MCNC: 9 MG/DL (ref 8.6–10.2)
CHLORIDE SERPL-SCNC: 107 MMOL/L (ref 98–107)
CO2 SERPL-SCNC: 26 MMOL/L (ref 22–29)
CREAT SERPL-MCNC: 0.7 MG/DL (ref 0.5–1)
EOSINOPHIL # BLD: 0.24 K/UL (ref 0.05–0.5)
EOSINOPHILS RELATIVE PERCENT: 4 % (ref 0–6)
ERYTHROCYTE [DISTWIDTH] IN BLOOD BY AUTOMATED COUNT: 15.4 % (ref 11.5–15)
GFR SERPL CREATININE-BSD FRML MDRD: >60 ML/MIN/1.73M2
GLUCOSE SERPL-MCNC: 106 MG/DL (ref 74–99)
HCT VFR BLD AUTO: 24.4 % (ref 34–48)
HGB BLD-MCNC: 7.5 G/DL (ref 11.5–15.5)
IMM GRANULOCYTES # BLD AUTO: 0.05 K/UL (ref 0–0.58)
IMM GRANULOCYTES NFR BLD: 1 % (ref 0–5)
LYMPHOCYTES NFR BLD: 0.92 K/UL (ref 1.5–4)
LYMPHOCYTES RELATIVE PERCENT: 14 % (ref 20–42)
MAGNESIUM SERPL-MCNC: 1.9 MG/DL (ref 1.6–2.6)
MCH RBC QN AUTO: 29.4 PG (ref 26–35)
MCHC RBC AUTO-ENTMCNC: 30.7 G/DL (ref 32–34.5)
MCV RBC AUTO: 95.7 FL (ref 80–99.9)
MONOCYTES NFR BLD: 0.47 K/UL (ref 0.1–0.95)
MONOCYTES NFR BLD: 7 % (ref 2–12)
NEUTROPHILS NFR BLD: 75 % (ref 43–80)
NEUTS SEG NFR BLD: 5.1 K/UL (ref 1.8–7.3)
PHOSPHATE SERPL-MCNC: 3.4 MG/DL (ref 2.5–4.5)
PLATELET # BLD AUTO: 310 K/UL (ref 130–450)
PMV BLD AUTO: 10 FL (ref 7–12)
POTASSIUM SERPL-SCNC: 3.4 MMOL/L (ref 3.5–5)
RBC # BLD AUTO: 2.55 M/UL (ref 3.5–5.5)
SODIUM SERPL-SCNC: 144 MMOL/L (ref 132–146)
WBC OTHER # BLD: 6.8 K/UL (ref 4.5–11.5)

## 2024-03-06 PROCEDURE — 83735 ASSAY OF MAGNESIUM: CPT

## 2024-03-06 PROCEDURE — 6370000000 HC RX 637 (ALT 250 FOR IP): Performed by: SURGERY

## 2024-03-06 PROCEDURE — 84100 ASSAY OF PHOSPHORUS: CPT

## 2024-03-06 PROCEDURE — 36430 TRANSFUSION BLD/BLD COMPNT: CPT

## 2024-03-06 PROCEDURE — 6370000000 HC RX 637 (ALT 250 FOR IP): Performed by: INTERNAL MEDICINE

## 2024-03-06 PROCEDURE — 80048 BASIC METABOLIC PNL TOTAL CA: CPT

## 2024-03-06 PROCEDURE — 85025 COMPLETE CBC W/AUTO DIFF WBC: CPT

## 2024-03-06 PROCEDURE — 36415 COLL VENOUS BLD VENIPUNCTURE: CPT

## 2024-03-06 PROCEDURE — 93246 EXT ECG>7D<15D RECORDING: CPT

## 2024-03-06 RX ADMIN — LETROZOLE 2.5 MG: 2.5 TABLET ORAL at 09:00

## 2024-03-06 RX ADMIN — ASPIRIN 81 MG: 81 TABLET, CHEWABLE ORAL at 09:00

## 2024-03-06 RX ADMIN — PROPAFENONE HYDROCHLORIDE 150 MG: 150 TABLET, FILM COATED ORAL at 06:26

## 2024-03-06 RX ADMIN — POTASSIUM CHLORIDE 40 MEQ: 1500 TABLET, EXTENDED RELEASE ORAL at 12:07

## 2024-03-06 ASSESSMENT — PAIN SCALES - GENERAL
PAINLEVEL_OUTOF10: 0

## 2024-03-06 NOTE — PROGRESS NOTES
OCCUPATIONAL THERAPY BEDSIDE TREATMENT NOTE  [facility]  [address] [place]. [place]    Date:2024  Patient Name: Ana Garcia \"Amber\"  MRN: [id number]  : 1945  Room: [facility]032-       Evaluating OT: Malissa Herndon OTR/L; [id number]      Referring Provider and Specific Provider Orders/Date:      24 07   OT eval and treat  Start:  24,   End:  24 07,   ONE TIME,   Standing Count:  1 Occurrences,   R         Juan Fox MD       Placement Recommendation: HHOT        Diagnosis:   1. Colon obstruction (HCC)         Surgery: lap right hemicolectomy for acute GIB diverticular and hemorrhagic shock        Pertinent Medical History:       Past Medical History        Past Medical History:   Diagnosis Date    A-fib (HCC)      Arthritis      Cancer (HCC)       BREAST    GERD (gastroesophageal reflux disease)      Osteoporosis      Thoracic aortic aneurysm (HCC)              Past Surgical History         Past Surgical History:   Procedure Laterality Date    BREAST SURGERY         lumpectomy right breast    COLONOSCOPY        ENDOSCOPY, COLON, DIAGNOSTIC        LAPAROTOMY Right 2024     EXPLORATORY LAPAROTOMY WITH RIGHT COLON RESECTION POSSIBLE OSTEOTOMY performed by Juan Fox MD at [facility] OR    TOTAL KNEE ARTHROPLASTY Left 3/14/2023     LEFT KNEE TOTAL ARTHROPLASTY performed by JOLEEN Campbell DO at [facility] OR    VASCULAR SURGERY         thoracic aortic aneurysm repair          Precautions:  Fall Risk, lap right hemicolectomy for acute GIB diverticular and hemorrhagic shock, nursing notified large amount of blood in commode after toileting (at eval).        Assessment of current deficits:     [x] Functional mobility            [x]ADLs           [x] Strength                   []Cognition    [x] Functional transfers          [x] IADLs          [] Safety Awareness   [x]Endurance    [] Fine Coordination              [x] 
    OCCUPATIONAL THERAPY BEDSIDE TREATMENT NOTE  [facility]  [address] [facility] [place]. [place]    Date:3/5/2024  Patient Name: Ana Garcia \"Amber\"  MRN: [id number]  : 1945  Room: [address] [place], [place] [id number]-         Evaluating OT: Malissa Herndon OTR/L; [id number]      Referring Provider and Specific Provider Orders/Date:      24 07   OT eval and treat  Start:  24,   End:  24 07,   ONE TIME,   Standing Count:  1 Occurrences,   R         Juan Fox MD       Placement Recommendation: HHOT        Diagnosis:   1. Colon obstruction (HCC)         Surgery: lap right hemicolectomy for acute GIB diverticular and hemorrhagic shock        Pertinent Medical History:       Past Medical History           Past Medical History:   Diagnosis Date    A-fib (HCC)      Arthritis      Cancer (HCC)       BREAST    GERD (gastroesophageal reflux disease)      Osteoporosis      Thoracic aortic aneurysm (HCC)              Past Surgical History             Past Surgical History:   Procedure Laterality Date    BREAST SURGERY         lumpectomy right breast    COLONOSCOPY        ENDOSCOPY, COLON, DIAGNOSTIC        LAPAROTOMY Right 2024     EXPLORATORY LAPAROTOMY WITH RIGHT COLON RESECTION POSSIBLE OSTEOTOMY performed by Juan Fox MD at [facility] OR    TOTAL KNEE ARTHROPLASTY Left 3/14/2023     LEFT KNEE TOTAL ARTHROPLASTY performed by JOLEEN Campbell DO at [facility] OR    VASCULAR SURGERY         thoracic aortic aneurysm repair          Precautions:  Fall Risk, lap right hemicolectomy for acute GIB diverticular and hemorrhagic shock, nursing notified large amount of blood in commode after toileting (at eval).        Assessment of current deficits:     [x] Functional mobility            [x]ADLs           [x] Strength                   []Cognition    [x] Functional transfers          [x] IADLs          [] Safety Awareness   [x]Endurance    [] Fine Coordination              
 visited with patient and guest. Patient was in good spirits and very pleasant.  offered empathy, reminiscing and nurtured hope. Patient expressed gratitude and asked for a daily visit from Spiritual health. If additional support is requested or needed please reach out to Spiritual Health ().    Chaplain Rolly Gonzalez     03/02/24 6136   Encounter Summary   Encounter Overview/Reason  Spiritual/Emotional Needs;Initial Encounter   Service Provided For: Patient;Friend   [facility]   Support System Family members;Friends/neighbors   Last Encounter  03/02/24  (HD)   Complexity of Encounter Moderate   Spiritual/Emotional needs   Type Spiritual Support   Assessment/Intervention/Outcome   Assessment Calm;Hopeful   Intervention Active listening;Discussed belief system/Mormon practices/luisa;Discussed illness injury and it’s impact;Explored/Affirmed feelings, thoughts, concerns;Explored Coping Skills/Resources;Nurtured Hope;Prayer (assurance of)/[place];Read/Provided Scripture;Sustaining Presence/Ministry of presence   Outcome Acceptance;Comfort;Encouraged;Engaged in conversation;Expressed feelings, needs, and concerns;Expressed feelings of Freda, Peace and/or Love;Expressed Gratitude;Receptive       
14 day ZIO XT applied 03/06/2024. Instructions given. Gail Lange      
Cardiology  Progress Note      SUBJECTIVE:  No chest pain. No dyspnea.  No palpitation.    Current Inpatient Medications  Current Facility-Administered Medications: [Held by provider] enoxaparin (LOVENOX) injection 90 mg, 1 mg/kg, SubCUTAneous, BID  letrozole (FEMARA) tablet 2.5 mg, 2.5 mg, Oral, Daily  propafenone (RYTHMOL) tablet 150 mg, 150 mg, Oral, Q8H  magnesium sulfate 2000 mg in 50 mL IVPB premix, 2,000 mg, IntraVENous, PRN  sodium phosphate 14.52 mmol in sodium chloride 0.9 % 250 mL IVPB, 0.16 mmol/kg, IntraVENous, PRN **OR** sodium phosphate 29.01 mmol in sodium chloride 0.9 % 250 mL IVPB, 0.32 mmol/kg, IntraVENous, PRN  potassium chloride (KLOR-CON M) extended release tablet 40 mEq, 40 mEq, Oral, PRN **OR** potassium bicarb-citric acid (EFFER-K) effervescent tablet 40 mEq, 40 mEq, Oral, PRN **OR** potassium chloride 10 mEq/100 mL IVPB (Peripheral Line), 10 mEq, IntraVENous, PRN  0.9 % sodium chloride infusion, , IntraVENous, PRN  traMADol (ULTRAM) tablet 25 mg, 25 mg, Oral, Q6H PRN **OR** traMADol (ULTRAM) tablet 50 mg, 50 mg, Oral, Q6H PRN  methocarbamol (ROBAXIN) tablet 500 mg, 500 mg, Oral, 4x Daily  morphine (PF) injection 2 mg, 2 mg, IntraVENous, Q3H PRN      Physical  VITALS:  BP (!) 122/57   Pulse 83   Temp 98.6 °F (37 °C) (Oral)   Resp 18   Ht 1.676 m (5' 5.98\")   Wt 90.7 kg (200 lb)   SpO2 95%   BMI 32.30 kg/m²   CURRENT TEMPERATURE:  Temp: 98.6 °F (37 °C)  CONSTITUTIONAL: No acute distress.  EYES: Vision is intact.  ENT: No sore throat.  No ear drainage.  NECK: No JVD.  BACK: Symmetric.  LUNGS:  diminished breath sounds left base  CARDIOVASCULAR:  normal S1 and S2, no S3, and no S4  ABDOMEN:  non-distended  NEUROLOGIC: No focal deficits.    EXTREMITIES: No edema cyanosis or clubbing.    DATA:        Cardiology Labs:    Lab Results   Component Value Date/Time     03/04/2024 04:14 AM    K 3.5 03/04/2024 04:14 AM    K 4.4 03/09/2023 09:00 AM     03/04/2024 04:14 AM    CO2 23 
Cardiology  Progress Note      SUBJECTIVE:  No chest pain. No dyspnea.  She was sitting up when I came to see her.  Family member was at bedside.  She wants to go home    Current Inpatient Medications  Current Facility-Administered Medications: aspirin chewable tablet 81 mg, 81 mg, Oral, Daily  [Held by provider] apixaban (ELIQUIS) tablet 5 mg, 5 mg, Oral, BID  letrozole (FEMARA) tablet 2.5 mg, 2.5 mg, Oral, Daily  propafenone (RYTHMOL) tablet 150 mg, 150 mg, Oral, Q8H  magnesium sulfate 2000 mg in 50 mL IVPB premix, 2,000 mg, IntraVENous, PRN  sodium phosphate 14.52 mmol in sodium chloride 0.9 % 250 mL IVPB, 0.16 mmol/kg, IntraVENous, PRN **OR** sodium phosphate 29.01 mmol in sodium chloride 0.9 % 250 mL IVPB, 0.32 mmol/kg, IntraVENous, PRN  potassium chloride (KLOR-CON M) extended release tablet 40 mEq, 40 mEq, Oral, PRN **OR** potassium bicarb-citric acid (EFFER-K) effervescent tablet 40 mEq, 40 mEq, Oral, PRN **OR** potassium chloride 10 mEq/100 mL IVPB (Peripheral Line), 10 mEq, IntraVENous, PRN  0.9 % sodium chloride infusion, , IntraVENous, PRN  traMADol (ULTRAM) tablet 25 mg, 25 mg, Oral, Q6H PRN **OR** traMADol (ULTRAM) tablet 50 mg, 50 mg, Oral, Q6H PRN  methocarbamol (ROBAXIN) tablet 500 mg, 500 mg, Oral, 4x Daily  morphine (PF) injection 2 mg, 2 mg, IntraVENous, Q3H PRN      Physical  VITALS:  /65   Pulse 80   Temp 99.4 °F (37.4 °C) (Oral)   Resp 15   Ht 1.676 m (5' 5.98\")   Wt 90.7 kg (200 lb)   SpO2 95%   BMI 32.30 kg/m²   CURRENT TEMPERATURE:  Temp: 99.4 °F (37.4 °C)  CONSTITUTIONAL: No acute distress.  EYES: Vision is intact.  ENT: No sore throat.  No ear drainage.  NECK: No JVD.  BACK: Symmetric.  LUNGS:  diminished breath sounds left base  CARDIOVASCULAR:  normal S1 and S2, no S3, and no S4  ABDOMEN:  non-tender  NEUROLOGIC: No focal deficits.    EXTREMITIES: No edema cyanosis or clubbing.    DATA:        Cardiology Labs:    Lab Results   Component Value Date/Time     03/05/2024 
General Surgery Progress Note  [facility]    Patient's Name/Date of Birth: Ana Garcia / 1945    Date: March 3, 2024     Surgeon: MD Maykel    Chief Complaint: GI bleed, status post right hemicolectomy    Patient Active Problem List   Diagnosis    Degenerative arthritis of left knee    Acute GI bleeding    Perforated diverticulum    Atrial fibrillation with RVR (HCC)       Subjective: Doing well from a surgical standpoint.  Wants to go home however and now having worsening tenderness from left axillary known cyst.    Objective:  /71   Pulse 94   Temp 98.3 °F (36.8 °C) (Oral)   Resp 18   Ht 1.676 m (5' 6\")   Wt 90.7 kg (200 lb)   SpO2 98%   BMI 32.28 kg/m²   Labs:  Recent Labs     03/01/24  0438 03/02/24  1012 03/03/24  0722   WBC 7.4 7.0 6.3   HGB 7.6* 7.6* 7.9*   HCT 23.3* 24.1* 24.0*     Lab Results   Component Value Date    CREATININE 0.6 03/03/2024    BUN 7 03/03/2024     03/03/2024    K 3.5 03/03/2024     03/03/2024    CO2 25 03/03/2024     No results for input(s): \"LIPASE\", \"AMYLASE\" in the last 72 hours.  CBC with Differential:    Lab Results   Component Value Date/Time    WBC 6.3 03/03/2024 07:22 AM    RBC 2.57 03/03/2024 07:22 AM    HGB 7.9 03/03/2024 07:22 AM    HCT 24.0 03/03/2024 07:22 AM     03/03/2024 07:22 AM    MCV 93.4 03/03/2024 07:22 AM    MCH 30.7 03/03/2024 07:22 AM    MCHC 32.9 03/03/2024 07:22 AM    RDW 15.1 03/03/2024 07:22 AM    LYMPHOPCT 13 03/03/2024 07:22 AM    MONOPCT 8 03/03/2024 07:22 AM    BASOPCT 1 03/03/2024 07:22 AM    MONOSABS 0.53 03/03/2024 07:22 AM    LYMPHSABS 0.82 03/03/2024 07:22 AM    EOSABS 0.24 03/03/2024 07:22 AM    BASOSABS 0.03 03/03/2024 07:22 AM     CMP:    Lab Results   Component Value Date/Time     03/03/2024 07:22 AM    K 3.5 03/03/2024 07:22 AM    K 4.4 03/09/2023 09:00 AM     03/03/2024 07:22 AM    CO2 25 03/03/2024 07:22 AM    BUN 7 03/03/2024 07:22 AM    CREATININE 0.6 03/03/2024 07:22 AM    
General Surgery Progress Note  [facility]    Patient's Name/Date of Birth: Ana Garcia / 1945    Date: March 2, 2024     Surgeon: MD Maykel    Chief Complaint: GI bleed, status post right hemicolectomy    Patient Active Problem List   Diagnosis    Degenerative arthritis of left knee    Acute GI bleeding    Perforated diverticulum    Atrial fibrillation with RVR (HCC)       Subjective: Patient is doing well.  She denies any abdominal pain.  She is tolerating a general diet.  She had a nonbloody loose bowel movement yesterday.  She has been up and ambulating and is currently sitting in the bedside chair.    Objective:  /63   Pulse 72   Temp 97.1 °F (36.2 °C) (Infrared)   Resp 18   Wt 90.7 kg (200 lb)   SpO2 98%   BMI 32.28 kg/m²   Labs:  Recent Labs     02/29/24  0429 03/01/24  0438   WBC 11.4 7.4   HGB 8.1* 7.6*   HCT 24.4* 23.3*     Lab Results   Component Value Date    CREATININE 0.7 03/01/2024    BUN 15 03/01/2024     03/01/2024    K 3.8 03/01/2024     (H) 03/01/2024    CO2 26 03/01/2024     No results for input(s): \"LIPASE\", \"AMYLASE\" in the last 72 hours.  CBC with Differential:    Lab Results   Component Value Date/Time    WBC 7.4 03/01/2024 04:38 AM    RBC 2.46 03/01/2024 04:38 AM    HGB 7.6 03/01/2024 04:38 AM    HCT 23.3 03/01/2024 04:38 AM     03/01/2024 04:38 AM    MCV 94.7 03/01/2024 04:38 AM    MCH 30.9 03/01/2024 04:38 AM    MCHC 32.6 03/01/2024 04:38 AM    RDW 15.3 03/01/2024 04:38 AM    LYMPHOPCT 14 03/01/2024 04:38 AM    MONOPCT 9 03/01/2024 04:38 AM    BASOPCT 0 03/01/2024 04:38 AM    MONOSABS 0.67 03/01/2024 04:38 AM    LYMPHSABS 1.06 03/01/2024 04:38 AM    EOSABS 0.14 03/01/2024 04:38 AM    BASOSABS 0.02 03/01/2024 04:38 AM     CMP:    Lab Results   Component Value Date/Time     03/01/2024 04:38 AM    K 3.8 03/01/2024 04:38 AM    K 4.4 03/09/2023 09:00 AM     03/01/2024 04:38 AM    CO2 26 03/01/2024 04:38 AM    BUN 15 03/01/2024 
Informed consent signed and placed in patient chart  
Internal Medicine Progress Note    [facility]    Armani Mejia D.O., LAURO.                    Willie Carmona D.O., LAURA Martin D.O.    Marivel Nagy, MSN, APRN, NP-C  Anastacio Vasquez, MSN, APRN-CNP  Deep Rojas, MSN, APRN, NP-C     Primary Care Physician: Viviana Cummins MD   Admitting Physician:  Juan Fox MD  Admission date and time: 2/26/2024  7:58 AM    Room:  [address] [place] [address]  Admitting diagnosis: Colon obstruction (HCC) [K56.609]  Perforated diverticulum [K57.80]    Patient Name: Ana Garcia  MRN: [id number]    Date of Service: 2/29/2024     Subjective:  Ana is a 78 y.o. female who was seen and examined today,2/29/2024, at the bedside.  Patient sitting up in chair and doing well.  The patient is tolerating diet had bowel movement yesterday without blood.  Patient is scheduled for I&D of cyst tomorrow under the left axilla.  Otherwise resting comfortably no chest pain or shortness of breath    Daughter present at the bedside    Review of System:   Constitutional:   Denies fever or chills, weight loss or gain, fatigue or malaise.  HEENT:   Denies ear pain, sore throat, sinus or eye problems.  NG tube in place.  Cardiovascular:   Denies any chest pain, irregular heartbeats, or palpitations.   Respiratory:   Denies shortness of breath, coughing, sputum production, hemoptysis, or wheezing.  Gastrointestinal:   Postoperative abdominal soreness as to be expected.  Genitourinary:    Denies any urgency, frequency, hematuria. Voiding  without difficulty.  Extremities:   Denies lower extremity swelling, edema or cyanosis.   Neurology:    Denies any headache or focal neurological deficits, Denies generalized weakness or memory difficulty.   Psch:   Denies being anxious or depressed.  Musculoskeletal:    Denies  myalgias, joint complaints or back pain.   Integumentary:   Denies any rashes, ulcers, or excoriations.  Denies 
Internal Medicine Progress Note    [facility]    Armani Mejia D.O., LAURO.                    Willie Carmona D.O., LAURA Martin D.O.    Marivel Nagy, MSN, APRN, NP-C  Anastacio Vasquez, MSN, APRN-CNP  Deep Rojas, MSN, APRN, NP-C     Primary Care Physician: Viviana Cummins MD   Admitting Physician:  Juan Fox MD  Admission date and time: 2/26/2024  7:58 AM    Room:  [address] [place] [address]  Admitting diagnosis: Colon obstruction (HCC) [K56.609]  Perforated diverticulum [K57.80]    Patient Name: Ana Garcia  MRN: [id number]    Date of Service: 3/2/2024     Subjective:  Ana is a 78 y.o. female who was seen and examined today,3/2/2024, at the bedside.  Patient is resting comfortably.  She remains on amiodarone infusion EKG was being done at this present time and it showed sinus rhythm, bradycardia.  Will transition back to Rythmol when amiodarone infusion is completed.  She remains on room air with no shortness of breath.    Family present at the bedside    Review of System:   Constitutional:   Denies fever or chills, weight loss or gain, fatigue or malaise.  HEENT:   Denies ear pain, sore throat, sinus or eye problems.    Cardiovascular:   Denies any chest pain, irregular heartbeats, or palpitations.   Respiratory:   Denies shortness of breath, coughing, sputum production, hemoptysis, or wheezing.  Gastrointestinal:   Postoperative abdominal soreness as to be expected.  Genitourinary:    Denies any urgency, frequency, hematuria. Voiding  without difficulty.  Extremities:   Denies lower extremity swelling, edema or cyanosis.  Left axillary cyst  Neurology:    Denies any headache or focal neurological deficits, Denies generalized weakness or memory difficulty.   Psch:   Denies being anxious or depressed.  Musculoskeletal:    Denies  myalgias, joint complaints or back pain.   Integumentary:   Denies any rashes, ulcers, or excoriations.  
Internal Medicine Progress Note    [facility]    Armani Mejia D.O., LAURO.                    Willie Carmona D.O., LAURA Martin D.O.    Marivel Nagy, MSN, APRN, NP-C  Anastacio Vasquez, MSN, APRN-CNP  Deep Rojas, MSN, APRN, NP-C     Primary Care Physician: Viviana Cummins MD   Admitting Physician:  Juan Fox MD  Admission date and time: 2/26/2024  7:58 AM    Room:  [address] [place] [address]  Admitting diagnosis: Colon obstruction (HCC) [K56.609]  Perforated diverticulum [K57.80]    Patient Name: Ana Garcia  MRN: [id number]    Date of Service: 2/28/2024     Subjective:  Ana is a 78 y.o. female who was seen and examined today,2/28/2024, at the bedside.  Ana is seated at the bedside chair resting comfortably.  She states the beds are very uncomfortable and she is obtaining very little sleep.  Fortunately, she describes very minimal postoperative abdominal pain.  She is passing flatus but has not yet had any bowel movements.  She is tolerating a diet.  We discussed the mildly downward trend in hemoglobin as to be expected in the setting of surgery and IV fluids.  I am hesitant to resume aspirin and Plavix yet.    Review of System:   Constitutional:   Denies fever or chills, weight loss or gain, fatigue or malaise.  HEENT:   Denies ear pain, sore throat, sinus or eye problems.  NG tube in place.  Cardiovascular:   Denies any chest pain, irregular heartbeats, or palpitations.   Respiratory:   Denies shortness of breath, coughing, sputum production, hemoptysis, or wheezing.  Gastrointestinal:   Postoperative abdominal soreness as to be expected.  Genitourinary:    Denies any urgency, frequency, hematuria. Voiding  without difficulty.  Extremities:   Denies lower extremity swelling, edema or cyanosis.   Neurology:    Denies any headache or focal neurological deficits, Denies generalized weakness or memory difficulty.   Psch:   Denies being 
Internal Medicine Progress Note    [facility]    Armani Mejia D.O., LAURO.                    Willie Carmona D.O., LAURA Martin D.O.    Marivel Nagy, MSN, APRN, NP-C  Anastacio Vasquez, MSN, APRN-CNP  Deep Rojas, MSN, APRN, NP-C     Primary Care Physician: Viviana Cummins MD   Admitting Physician:  Juan Fox MD  Admission date and time: 2/26/2024  7:58 AM    Room:  [address] [place] [address]  Admitting diagnosis: Colon obstruction (HCC) [K56.609]  Perforated diverticulum [K57.80]    Patient Name: Ana Garcia  MRN: [id number]    Date of Service: 2/27/2024     Subjective:  Ana is a 78 y.o. female who was seen and examined today,2/27/2024, at the bedside.  Ana underwent laparoscopic right hemicolectomy yesterday and tolerated this without complication.  She describes postoperative soreness as to be expected.  She has had no additional bloody bowel movements since the procedure.  We discussed ongoing holding of antiplatelet therapy.  She is very appreciative of the care she is receiving.    Review of System:   Constitutional:   Denies fever or chills, weight loss or gain, fatigue or malaise.  HEENT:   Denies ear pain, sore throat, sinus or eye problems.  NG tube in place.  Cardiovascular:   Denies any chest pain, irregular heartbeats, or palpitations.   Respiratory:   Denies shortness of breath, coughing, sputum production, hemoptysis, or wheezing.  Gastrointestinal:   Postoperative abdominal soreness as to be expected.  Genitourinary:    Denies any urgency, frequency, hematuria. Voiding  without difficulty.  Extremities:   Denies lower extremity swelling, edema or cyanosis.   Neurology:    Denies any headache or focal neurological deficits, Denies generalized weakness or memory difficulty.   Psch:   Denies being anxious or depressed.  Musculoskeletal:    Denies  myalgias, joint complaints or back pain.   Integumentary:   Denies any 
No changes from surgery  Juan Fox MD    
OCCUPATIONAL THERAPY INITIAL EVALUATION    [facility]  [address] [facility] [place]. [place]        Date:2024                                                  Patient Name: Ana Garcia    MRN: [id number]    : 1945    Room: [address] [place] [address]      Evaluating OT: Malissa Herndon OTR/L; [id number]     Referring Provider and Specific Provider Orders/Date:      24 07  OT eval and treat  Start:  24 07,   End:  24 07,   ONE TIME,   Standing Count:  1 Occurrences,   R         Juan Fox MD      Placement Recommendation: HHOT       Diagnosis:   1. Colon obstruction (HCC)         Surgery: lap right hemicolectomy for acute GIB diverticular and hemorrhagic shock       Pertinent Medical History:       Past Medical History:   Diagnosis Date    A-fib (HCC)     Arthritis     Cancer (HCC)     BREAST    GERD (gastroesophageal reflux disease)     Osteoporosis     Thoracic aortic aneurysm (HCC)          Past Surgical History:   Procedure Laterality Date    BREAST SURGERY      lumpectomy right breast    COLONOSCOPY      ENDOSCOPY, COLON, DIAGNOSTIC      LAPAROTOMY Right 2024    EXPLORATORY LAPAROTOMY WITH RIGHT COLON RESECTION POSSIBLE OSTEOTOMY performed by Juan Fox MD at [facility] OR    TOTAL KNEE ARTHROPLASTY Left 3/14/2023    LEFT KNEE TOTAL ARTHROPLASTY performed by JOLEEN Campbell DO at [facility] OR    VASCULAR SURGERY      thoracic aortic aneurysm repair      Precautions:  Fall Risk, lap right hemicolectomy for acute GIB diverticular and hemorrhagic shock, nursing notified large amount of blood in commode after toileting.       Assessment of current deficits:     [x] Functional mobility  [x]ADLs  [x] Strength               []Cognition    [x] Functional transfers   [x] IADLs         [] Safety Awareness   [x]Endurance    [] Fine Coordination              [x] Balance      [] Vision/perception   []Sensation     []Gross Motor Coordination  [] ROM  [] 
Patient had bowel movement with dark red blood in stool.  Patient admitted with GI bleed and is post hemicolectomy.  POCT Hemoccult stool preformed and positive.  Dr. Brar made aware.  Hgb currently 7.7  
Pt  told her primary RN that her heart did not feel right and she needs her heart medication rythmol. Primary RN asked for a second RN to assess her. Pt apical HR was in the 180's and irregular. I called Dr. Mejia and he said to call a \"rapid\". I called the rapid at 0544.   
Pt transferred from 3rd floor to 5th floor after RRT this AM.  Pt demo's increased HR.  Will attempt tx with pt at later time/date, when appropriate. Thank you. THA Marroquin/MAGDA #[id number]    
Surgery Progress Note            Chief complaint:   Chief Complaint   Patient presents with    GI Bleeding     Onset 0600-dark and bright red-had normal bm at 0530      Patient Active Problem List   Diagnosis    Degenerative arthritis of left knee    Acute GI bleeding    Perforated diverticulum       S: doing well, but had some nausea, has a cyst on left axilla    O:   Vitals:    02/29/24 0445   BP: (!) 154/66   Pulse: 91   Resp: 20   Temp: 97.9 °F (36.6 °C)   SpO2: 93%       Intake/Output Summary (Last 24 hours) at 2/29/2024 0742  Last data filed at 2/28/2024 1322  Gross per 24 hour   Intake 300 ml   Output --   Net 300 ml             Labs:  Lab Results   Component Value Date/Time    WBC 11.4 02/29/2024 04:29 AM    WBC 16.7 02/28/2024 04:29 AM    WBC 6.9 02/26/2024 08:30 AM    HGB 8.1 02/29/2024 04:29 AM    HGB 8.3 02/28/2024 04:29 AM    HGB 9.9 02/27/2024 10:31 AM    HCT 24.4 02/29/2024 04:29 AM    HCT 25.2 02/28/2024 04:29 AM    HCT 30.4 02/27/2024 10:31 AM     Lab Results   Component Value Date    CREATININE 0.6 02/29/2024    BUN 12 02/29/2024     02/29/2024    K 4.0 02/29/2024     02/29/2024    CO2 24 02/29/2024     Lab Results   Component Value Date/Time    LIPASE 23 02/26/2024 08:30 AM         Physical exam:   BP (!) 154/66   Pulse 91   Temp 97.9 °F (36.6 °C) (Oral)   Resp 20   Wt 90.7 kg (200 lb)   SpO2 93%   BMI 32.28 kg/m²   General appearance: NAD  Head: NCAT  Neck: supple, no masses  Lungs: equal chest rise bilateral  Heart: S1S2 present  Abdomen: soft, nontender, nondistended,  Incisions c/d/i  Skin; no lesions  Gu: no cva tenderness  Extremities: extremities normal, atraumatic, no cyanosis or edema    A:  POD # 3 lap right hemicolectomy for acute GIB diverticular and hemorrhagic shock    P: regular diet, npo at [place] and if doing well will plan for cyst excision    Juan Fox MD  2/29/2024    
Surgery Progress Note            Chief complaint:   Chief Complaint   Patient presents with    GI Bleeding     Onset 0600-dark and bright red-had normal bm at 0530      Patient Active Problem List   Diagnosis    Degenerative arthritis of left knee    Acute GI bleeding    Perforated diverticulum       S: doing well, no more bloody bms    O:   Vitals:    02/27/24 0557   BP: 123/67   Pulse: 83   Resp: 16   Temp: 97.7 °F (36.5 °C)   SpO2: 97%       Intake/Output Summary (Last 24 hours) at 2/27/2024 0655  Last data filed at 2/27/2024 0525  Gross per 24 hour   Intake 1050 ml   Output 750 ml   Net 300 ml           Labs:  Lab Results   Component Value Date/Time    WBC 6.9 02/26/2024 08:30 AM    WBC 6.0 03/09/2023 09:00 AM    HGB 10.6 02/26/2024 10:31 PM    HGB 11.1 02/26/2024 05:00 PM    HGB 13.9 02/26/2024 12:38 PM    HCT 32.6 02/26/2024 10:31 PM    HCT 34.3 02/26/2024 05:00 PM    HCT 42.9 02/26/2024 12:38 PM     Lab Results   Component Value Date    CREATININE 0.7 02/26/2024    BUN 9 02/26/2024     02/26/2024    K 4.2 02/26/2024     02/26/2024    CO2 26 02/26/2024     Lab Results   Component Value Date/Time    LIPASE 23 02/26/2024 08:30 AM         Physical exam:   /67   Pulse 83   Temp 97.7 °F (36.5 °C) (Oral)   Resp 16   Wt 90.7 kg (200 lb)   SpO2 97%   BMI 32.28 kg/m²   General appearance: NAD  Head: NCAT  Neck: supple, no masses  Lungs: equal chest rise bilateral  Heart: S1S2 present  Abdomen: soft, nontender, nondistended,  Incisions c/d/i  Skin; no lesions  Gu: no cva tenderness  Extremities: extremities normal, atraumatic, no cyanosis or edema    A:  POD # 1 lap right hemicolectomy for acute GIB diverticular and hemorrhagic shock    P: hicks and ngt out, regular diet, ptot    Juan Fox MD  2/27/2024    
Surgery Progress Note            Chief complaint:   Chief Complaint   Patient presents with    GI Bleeding     Onset 0600-dark and bright red-had normal bm at 0530      Patient Active Problem List   Diagnosis    Degenerative arthritis of left knee    Acute GI bleeding    Perforated diverticulum       S: doing well, no more bloody bms    O:   Vitals:    02/28/24 0515   BP: (!) 105/53   Pulse: 91   Resp: 18   Temp: 97.4 °F (36.3 °C)   SpO2: 92%       Intake/Output Summary (Last 24 hours) at 2/28/2024 0803  Last data filed at 2/28/2024 0528  Gross per 24 hour   Intake 518.83 ml   Output 1050 ml   Net -531.17 ml             Labs:  Lab Results   Component Value Date/Time    WBC 16.7 02/28/2024 04:29 AM    WBC 6.9 02/26/2024 08:30 AM    WBC 6.0 03/09/2023 09:00 AM    HGB 8.3 02/28/2024 04:29 AM    HGB 9.9 02/27/2024 10:31 AM    HGB 10.6 02/26/2024 10:31 PM    HCT 25.2 02/28/2024 04:29 AM    HCT 30.4 02/27/2024 10:31 AM    HCT 32.6 02/26/2024 10:31 PM     Lab Results   Component Value Date    CREATININE 0.8 02/28/2024    BUN 22 02/28/2024     02/28/2024    K 4.5 02/28/2024     02/28/2024    CO2 22 02/28/2024     Lab Results   Component Value Date/Time    LIPASE 23 02/26/2024 08:30 AM         Physical exam:   BP (!) 105/53   Pulse 91   Temp 97.4 °F (36.3 °C) (Oral)   Resp 18   Wt 90.7 kg (200 lb)   SpO2 92%   BMI 32.28 kg/m²   General appearance: NAD  Head: NCAT  Neck: supple, no masses  Lungs: equal chest rise bilateral  Heart: S1S2 present  Abdomen: soft, nontender, nondistended,  Incisions c/d/i  Skin; no lesions  Gu: no cva tenderness  Extremities: extremities normal, atraumatic, no cyanosis or edema    A:  POD # 2 lap right hemicolectomy for acute GIB diverticular and hemorrhagic shock    P: regular diet, ptot, home when bowels move    Juan Fox MD  2/28/2024    
  Component Value Date/Time     03/05/2024 07:50 AM    K 4.0 03/05/2024 07:50 AM    K 4.4 03/09/2023 09:00 AM     03/05/2024 07:50 AM    CO2 25 03/05/2024 07:50 AM    BUN 9 03/05/2024 07:50 AM    CREATININE 0.7 03/05/2024 07:50 AM    GLUCOSE 99 03/05/2024 07:50 AM    CALCIUM 8.9 03/05/2024 07:50 AM    LABGLOM >60 03/05/2024 07:50 AM      CBC:    Recent Labs     03/05/24  0750 03/04/24  0414 03/03/24  0722   WBC 6.4 6.4 6.3   HGB 8.0* 7.7* 7.9*   HCT 25.9* 24.0* 24.0*   MCV 95.9 94.1 93.4    308 271     TROPONIN:    Lab Results   Component Value Date    TROPHS 69 (H) 03/02/2024       ASSESSMENT & PLAN:      1.new onset atrial fibrillation with rapid ventricular response.  Patient went into atrial fibrillation with rapid ventricle response post right hemicolectomy.  She was taking Rythmol prior to her admission for one episode of atrial fibrillation that she had in the past.    Rythmol was stopped at the time of her colon surgery this admission.  Patient was  started on amiodarone drip and she did convert to sinus rhythm quickly after that.  Amiodarone drip was stopped and patient was put back on Rythmol.  No further episodes of atrial fibrillation.  Hemoglobin was 7.7 The same day.  It is a 8.0 today   Patient does not want to be on Anticoagulation therapy as she said that she was doing well on Rythmol with no recurrence of atrial flutter / fibrillation and she thinks her atrial ablation this admission was triggered by surgery and because she stopped taking Rythmol at the time of her surgery.  Patient may need evaluation for watchman device down the road especially if she continues to have episodes of atrial fibrillation.  Case was discussed with her daughter over the phone at phone [phone].  Ok for discharge from cardiac standpoint with plan for follow-up in my office in 3 weeks.              Electronically signed by Duane Damon MD on 3/5/2024 at 5:27 PM  
Needs:  Energy Requirements Based On: Formula  Weight Used for Energy Requirements: Current  Energy (kcal/day): 1600-1800kcal/day  (MSJ x 1.2-1.3 SF)  Weight Used for Protein Requirements: Ideal  Protein (g/day): 90-100g/day (1.5-1.7g/kg IBW)  Method Used for Fluid Requirements: 1 ml/kcal  Fluid (ml/day): 1600-1800ml/day    Nutrition Diagnosis:   Inadequate oral intake related to altered GI function (diverticular bleed) as evidenced by nutrition support - enteral nutrition    Nutrition Interventions:   Food and/or Nutrient Delivery: Continue Oral Nutrition Supplement, Continue Current Diet (Recommend Ensure Enlive BID (High [place]aine/High Pro) when diet advanced)  Nutrition Education/Counseling: No recommendation at this time  Coordination of Nutrition Care: Continue to monitor while inpatient     Goals:  Goals: PO intake 75% or greater, by next RD assessment     Nutrition Monitoring and Evaluation:   Behavioral-Environmental Outcomes: None Identified  Food/Nutrient Intake Outcomes: Food and Nutrient Intake, Supplement Intake  Physical Signs/Symptoms Outcomes: Biochemical Data, Chewing or Swallowing, GI Status, Fluid Status or Edema, Nutrition Focused Physical Findings, Skin, Weight    Discharge Planning:    Too soon to determine     Margy Gray RD  Contact: [phone]       
complaints or back pain.   Integumentary:   Denies any rashes, ulcers, or excoriations.  Denies bruising.  Hematologic/Lymphatic:  Denies bruising or bleeding.    Physical Exam:  No intake/output data recorded.    Intake/Output Summary (Last 24 hours) at 3/5/2024 1022  Last data filed at 3/4/2024 1149  Gross per 24 hour   Intake 150 ml   Output --   Net 150 ml     I/O last 3 completed shifts:  In: 150 [I.V.:150]  Out: -   Patient Vitals for the past 96 hrs (Last 3 readings):   Weight   03/02/24 1445 90.7 kg (200 lb)       Vital Signs:   Blood pressure 123/65, pulse 72, temperature 98.7 °F (37.1 °C), temperature source Oral, resp. rate 20, height 1.676 m (5' 5.98\"), weight 90.7 kg (200 lb), SpO2 94 %.    General appearance:  Alert, responsive, oriented to person, place, and time. Well preserved, alert, no distress.  Head:  Normocephalic. No masses, lesions or tenderness.  Eyes:  PERRLA.  EOMI.  Sclera clear.  Buccal mucosa moist.  ENT:  Ears normal. Mucosa normal.   Neck:    Supple. Trachea midline. No thyromegaly. No JVD. No bruits.  Heart:    Rhythm regular. Rate controlled.  No murmurs.  Lungs:    Symmetrical. Clear to auscultation bilaterally.  No wheezes. No rhonchi. No rales.  Abdomen:   Soft.  Very mildly tender to palpation as to be expected.  Bowel sounds positive.  Extremities:    Peripheral pulses present.  No peripheral edema.  No ulcers. No cyanosis. No clubbing.  Dressings in place to the surgical region of the left axilla  Neurologic:    Alert x 3.  No focal deficit.  Cranial nerves grossly intact. No focal weakness.  Psych:   Behavior is normal. Mood appears normal. Speech is not rapid and/or pressured.  Musculoskeletal:   Spine ROM normal. Muscular strength intact. Gait not assessed.  Integumentary:  No rashes  Skin normal color and texture.  Genitalia/Breast:  Deferred    Medication:  Scheduled Meds:   aspirin  81 mg Oral Daily    apixaban  5 mg Oral BID    letrozole  2.5 mg Oral Daily    
ending 03/04/24 1043  No intake/output data recorded.  Patient Vitals for the past 96 hrs (Last 3 readings):   Weight   03/02/24 1445 90.7 kg (200 lb)       Vital Signs:   Blood pressure 130/66, pulse 71, temperature 97.9 °F (36.6 °C), temperature source Infrared, resp. rate 18, height 1.676 m (5' 6\"), weight 90.7 kg (200 lb), SpO2 96 %.    General appearance:  Alert, responsive, oriented to person, place, and time. Well preserved, alert, no distress.  Head:  Normocephalic. No masses, lesions or tenderness.  Eyes:  PERRLA.  EOMI.  Sclera clear.  Buccal mucosa moist.  ENT:  Ears normal. Mucosa normal.   Neck:    Supple. Trachea midline. No thyromegaly. No JVD. No bruits.  Heart:    Rhythm regular. Rate controlled.  No murmurs.  Lungs:    Symmetrical. Clear to auscultation bilaterally.  No wheezes. No rhonchi. No rales.  Abdomen:   Soft.  Very mildly tender to palpation as to be expected.  Bowel sounds positive.  Extremities:    Peripheral pulses present.  No peripheral edema.  No ulcers. No cyanosis. No clubbing.  Left axillary cyst  Neurologic:    Alert x 3.  No focal deficit.  Cranial nerves grossly intact. No focal weakness.  Psych:   Behavior is normal. Mood appears normal. Speech is not rapid and/or pressured.  Musculoskeletal:   Spine ROM normal. Muscular strength intact. Gait not assessed.  Integumentary:  No rashes  Skin normal color and texture.  Genitalia/Breast:  Deferred    Medication:  Scheduled Meds:   [Held by provider] enoxaparin  1 mg/kg SubCUTAneous BID    letrozole  2.5 mg Oral Daily    propafenone  150 mg Oral Q8H    methocarbamol  500 mg Oral 4x Daily     Continuous Infusions:   sodium chloride         Objective Data:  CBC with Differential:    Lab Results   Component Value Date/Time    WBC 6.4 03/04/2024 04:14 AM    RBC 2.55 03/04/2024 04:14 AM    HGB 7.7 03/04/2024 04:14 AM    HCT 24.0 03/04/2024 04:14 AM     03/04/2024 04:14 AM    MCV 94.1 03/04/2024 04:14 AM    MCH 30.2 03/04/2024 
     [facility]   AM-PAC Daily Activity - Inpatient   How much help is needed for putting on and taking off regular lower body clothing?: A Little  How much help is needed for bathing (which includes washing, rinsing, drying)?: A Little  How much help is needed for toileting (which includes using toilet, bedpan, or urinal)?: A Little  How much help is needed for putting on and taking off regular upper body clothing?: A Little  How much help is needed for taking care of personal grooming?: A Little  How much help for eating meals?: None  AM-[facility] Inpatient Daily Activity Raw Score: 19  AM-PAC Inpatient ADL T-Scale Score : 40.22  ADL Inpatient CMS 0-100% Score: 42.8  ADL Inpatient CMS G-Code Modifier : CK                  Functional Assessment:     Initial Eval Status  Date: 2/28/24 Treatment Status  Date: 3/1/24 STGs = LTGs  Time frame: 10-14 days   Feeding Independent  Independent Independent    Grooming Minimal Assist  Supervision    Unsteadiness standing at sink completing unilateral tasks due to increase fatigue/SOB; Educated on EC techniques.   Independent    UB Dressing Minimal Assist for gown management and to tie gown.   Minimal Assist to doff outer gown   N/T Independent    LB Dressing Moderate Assist to doff underpants.  Moderate Assist to thread feet through incontinent garment while seated on commode, pt then stood with assistance to bring garment up around hips and waist.  N/T Modified [place]    Bathing Moderate Assist N/T    Supervision    Toileting Moderate Assist for hygiene, toileting wipe provided.   Large amount of blood in commode, nursing notified and presented to the room.  Supervision    Unsteadiness with toilet hygiene and clothing management.  Independent    Bed Mobility  Supine to sit: Moderate Assist   Sit to supine: Supervision    Rolling:Supervision   Supine to sit: Supervision     Supine to sit: Independent   Sit to supine: Independent   Rolling:Independent     Functional Transfers 
CONTRAST    Result Date: 2/26/2024  EXAMINATION: CTA OF THE ABDOMEN AND PELVIS WITH CONTRAST 2/26/2024 9:58 am:      1. There is question of some extravasation of contrast seen within the diverticulum in the ascending colon. Delayed imaging reveals increasing collection of contrast within the diverticulum. Findings concerning for an active GI bleed. Correlation to colonoscopy or possible tagged red blood cell bleeding scan can be performed for further evaluation 2. There is an area of rounded enhancement involving the distal sigmoid colon to suggest possibly a small polyp. This area measures approximately 5 mm in size. 3. There is fluid identified within the colon to suggest clinical presentation of diarrheal disease. 4. Diverticulosis of the colon. No evidence of diverticulitis. 5. Cholelithiasis. 6. Bilateral adrenal adenomas. 7. Infrarenal abdominal aortic aneurysm with successful stent placement. No evidence of an endovascular leak. 8. 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     CTA CHEST W CONTRAST    Result Date: 2/26/2024  EXAMINATION: CTA OF THE CHEST 2/26/2024 9:58 am      No evidence of pulmonary embolism or acute pulmonary abnormality. There is no thoracic aortic aneurysm or dissection.       Social history: Patient lives with grandson in a ranch home  with [place]  to enter home.   Walk in shower  , grab bars     Equipment owned: Wheelchair, Cane, Wheeled Walker, Rollator, Shower chair, and Elevated toilet seat,       AM-PAC Basic Mobility        AM-PAC Basic Mobility - Inpatient   How much help is needed turning from your back to your side while in a flat bed without using bedrails?: A Little  How much help is needed moving from lying on your back to sitting on the side of a flat bed without using bedrails?: A Little  How much help is needed moving to and from a bed to a chair?: A Little  How much help is needed standing up from a chair using your arms?: A 
HGB 7.9 03/03/2024 07:22 AM    HCT 24.0 03/03/2024 07:22 AM     03/03/2024 07:22 AM    MCV 93.4 03/03/2024 07:22 AM    MCH 30.7 03/03/2024 07:22 AM    MCHC 32.9 03/03/2024 07:22 AM    RDW 15.1 03/03/2024 07:22 AM    LYMPHOPCT 13 03/03/2024 07:22 AM    MONOPCT 8 03/03/2024 07:22 AM    BASOPCT 1 03/03/2024 07:22 AM    MONOSABS 0.53 03/03/2024 07:22 AM    LYMPHSABS 0.82 03/03/2024 07:22 AM    EOSABS 0.24 03/03/2024 07:22 AM    BASOSABS 0.03 03/03/2024 07:22 AM     BMP:    Lab Results   Component Value Date/Time     03/03/2024 07:22 AM    K 3.5 03/03/2024 07:22 AM    K 4.4 03/09/2023 09:00 AM     03/03/2024 07:22 AM    CO2 25 03/03/2024 07:22 AM    BUN 7 03/03/2024 07:22 AM    LABALBU 3.9 02/26/2024 08:30 AM    CREATININE 0.6 03/03/2024 07:22 AM    CALCIUM 8.6 03/03/2024 07:22 AM    LABGLOM >60 03/03/2024 07:22 AM    GLUCOSE 92 03/03/2024 07:22 AM       Assessment:    Active diverticular bleeding with evolving hemorrhagic shock status post laparoscopic right hemicolectomy  History of thoracic aortic aneurysm stenting on chronic aspirin and Plavix therapy  History of breast cancer  History of atrial fibrillation--currently paroxysmal with return to sinus rhythm  Left axillary cyst    Plan:     Patient currently converted back to sinus rhythm  The patient was transferred to immediate care unit  Continue Rythmol currently sinus rhythm  cardiology following  Follow EKGs and troponin  Hold anticoagulation therapy due to excision of left axillary cyst on March 4, 2024    More than 50% of my time was spent at the bedside counseling/coordinating care with the patient and/or family with face to face contact.  This time was spent reviewing notes and laboratory data as well as instructing and counseling the patient. Time I spent with the family or surrogate(s) is included only if the patient was incapable of providing the necessary information or participating in medical decisions. I also discussed the 
at the bedside counseling/coordinating care with the patient and/or family with face to face contact.  This time was spent reviewing notes and laboratory data as well as instructing and counseling the patient. Time I spent with the family or surrogate(s) is included only if the patient was incapable of providing the necessary information or participating in medical decisions. I also discussed the differential diagnosis and all of the proposed management plans with the patient and individuals accompanying the patient. I am readily available for any further decision-making and intervention.       Armani Mejia DO, F.A.C.O.I.  3/1/2024  3:07 PM   
  Strength BUE:  refer to OT eval  RLE:  4-/5  LLE:  4-/5  Increase strength in affected mm groups by 1/3 grade   Balance Sitting EOB:  good    Dynamic Standing:  fair +wheeled walker  Sitting EOB: not assessed   Dynamic Standing: good minus  Sitting EOB:  good    Dynamic Standing: good wheeled walker      Patient is Alert & Oriented x person, place, time, and situation and follows directions    Sensation:  Patient  denies numbness/tingling   Edema:  none noted   Endurance: fair  +    Vitals: room air   Blood Pressure at rest  Blood Pressure during session    Heart Rate at rest  Heart Rate during session    SPO2 at rest %  SPO2 during session      Patient education  Patient educated on role of Physical Therapy, risks of immobility, safety and plan of care,  importance of mobility while in hospital , ankle pumps, quad set and glut set for edema control, blood clot prevention, and log roll      Patient response to education:   Pt verbalized understanding Pt demonstrated skill Pt requires further education in this area   Yes Partial Yes      Treatment:  Patient practiced and was instructed/facilitated in the following treatment:   Patient ambulated in hallway, performed both seated and standing exercises.   Therapeutic Exercises: ankle pumps, heel raises, long arc quad, seated marching, standing calf raise, standing marching, standing hip abduction/adduction, standing hamstring curls, and mini squats, alt lunges x 10-20 reps., turning 360 degrees CW and CCW ,      At end of session, patient in chair with  call light and phone within reach,  all lines and tubes intact, nursing notified.      Patient would benefit from continued skilled Physical Therapy to improve functional independence and quality of life.         Patient's/ family goals   home    Time in 0832  Time out 0855    Total Treatment Time  23  minutes        CPT codes:    Therapeutic exercises (22610)   15 minutes  1 unit(s)  Gait Training (36629) 8 minutes 1 
affected mm groups by 1/3 grade   Balance Sitting EOB:  good    Dynamic Standing:  fair +wheeled walker  Sitting EOB: good in the chair  Dynamic Standing: fair + with wheeled walker   Sitting EOB:  good    Dynamic Standing: good wheeled walker      Patient is Alert & Oriented x person, place, time, and situation and follows directions    Sensation:  Patient  denies numbness/tingling   Edema:  none noted   Endurance: fair  +    Vitals: room air   Blood Pressure at rest  Blood Pressure during session    Heart Rate at rest  Heart Rate during session    SPO2 at rest %  SPO2 during session 92% in chair     Patient education  Patient educated on role of Physical Therapy, risks of immobility, safety and plan of care,  importance of mobility while in hospital , ankle pumps, quad set and glut set for edema control, blood clot prevention, and log roll      Patient response to education:   Pt verbalized understanding Pt demonstrated skill Pt requires further education in this area   Yes Partial Yes      Treatment:  Patient practiced and was instructed/facilitated in the following treatment: Patient     sitting in a chair at start of tx session.  Pt educated on the importance of mobility, maintaining strength, endurance, and safety. Pt stood, ambulated in the hallway, and back to the chair. Pt performed seated exercises.     Therapeutic Exercises:  ankle pumps, hip abduction/adduction, long arc quad, and seated marching,  x 20 reps.  AROM     At end of session, patient in chair with family/friend present call light and phone within reach,  all lines and tubes intact, nursing notified.      Patient would benefit from continued skilled Physical Therapy to improve functional independence and quality of life.         Patient's/ family goals   home    Time in 11:08  Time out 11:33    Total Treatment Time  25 minutes        CPT codes:    Therapeutic activities (52590)   16 minutes  1 unit(s)  Therapeutic exercises (09859)   9 minutes 
4 minutes    Dory Cooney   [facility]  LIC # [id number]      
codes:    Therapeutic activities (19370)   14 minutes  1 unit(s)  Therapeutic exercises (31713)   11 minutes  1 unit(s)    Mihir Hancock  [facility]  LIC # [id number]

## 2024-03-06 NOTE — PLAN OF CARE
Problem: Safety - Adult  Goal: Free from fall injury  Outcome: Progressing     Problem: Cardiovascular - Adult  Goal: Maintains optimal cardiac output and hemodynamic stability  Outcome: Progressing     Problem: Cardiovascular - Adult  Goal: Absence of cardiac dysrhythmias or at baseline  Outcome: Progressing     Problem: Musculoskeletal - Adult  Goal: Return mobility to safest level of function  Outcome: Progressing     Problem: Musculoskeletal - Adult  Goal: Return ADL status to a safe level of function  Outcome: Progressing     Problem: Gastrointestinal - Adult  Goal: Maintains or returns to baseline bowel function  Outcome: Progressing     Problem: Gastrointestinal - Adult  Goal: Maintains adequate nutritional intake  Outcome: Progressing     Problem: Hematologic - Adult  Goal: Maintains hematologic stability  Outcome: Progressing

## 2024-03-06 NOTE — CARE COORDINATION
3/6/24 1111 CM note: no covid testing. Room air. Discharge order noted. Discharge plan is home with [facility] for nursing and PT/OT. HHC order noted. Notified [facility], of pts discharge today and they will see pt on Friday. Updated pt on above. Pt to have Zio patch placed prior to discharge. Pts daughter will provide transportation home. Electronically signed by Chey Trevino RN on 3/6/2024 at 11:15 AM

## 2024-03-07 LAB — SURGICAL PATHOLOGY REPORT: NORMAL

## 2024-03-07 NOTE — DISCHARGE SUMMARY
[place] [id number]                            DISCHARGE SUMMARY      PATIENT NAME: ADDI PAZ                : 1945  MED REC NO: [id number]                        ROOM: 0517  ACCOUNT NO: [id number]                       ADMIT DATE: 2024  PROVIDER: Armani Sosa DO      ADMITTING DIAGNOSES:    DICTATION CANCELED          ARMANI SOSA DO      D:  2024 14:27:09     T:  2024 22:22:24     [facility]/S  Job #:  [id number]     Doc#:  [id number]     
Dictate [id number]  
time.          SRI SOSA DO      D:  03/06/2024 14:41:10     T:  03/07/2024 00:23:04     NIRAJ/JORDAN  Job #:  [id number]     Doc#:  [id number]

## 2024-03-15 ENCOUNTER — OFFICE VISIT (OUTPATIENT)
Dept: ORTHOPEDIC SURGERY | Age: 79
End: 2024-03-15
Payer: MEDICARE

## 2024-03-15 VITALS — BODY MASS INDEX: 32.14 KG/M2 | TEMPERATURE: 98 F | WEIGHT: 200 LBS | HEIGHT: 66 IN

## 2024-03-15 DIAGNOSIS — Z96.652 S/P TOTAL KNEE ARTHROPLASTY, LEFT: Primary | ICD-10-CM

## 2024-03-15 PROCEDURE — 99213 OFFICE O/P EST LOW 20 MIN: CPT | Performed by: ORTHOPAEDIC SURGERY

## 2024-03-15 PROCEDURE — 1123F ACP DISCUSS/DSCN MKR DOCD: CPT | Performed by: ORTHOPAEDIC SURGERY

## 2024-03-15 NOTE — PROGRESS NOTES
(FEMARA) 2.5 MG tablet TAKE 1 TABLET BY MOUTH DAILY      aspirin 81 MG EC tablet Take 1 tablet by mouth daily       No current facility-administered medications for this visit.       Patient Active Problem List   Diagnosis    Degenerative arthritis of left knee    Acute GI bleeding    Perforated diverticulum    Atrial fibrillation with RVR (HCC)       Past Medical History:   Diagnosis Date    A-fib (HCC)     Arthritis     Cancer (HCC)     BREAST    GERD (gastroesophageal reflux disease)     Osteoporosis     Thoracic aortic aneurysm (HCC)        Past Surgical History:   Procedure Laterality Date    AXILLARY SURGERY Left 3/4/2024    EXCISION LEFT AXILLARY CYST performed by Juan Fox MD at [facility] OR    BREAST SURGERY      lumpectomy right breast    COLONOSCOPY      ENDOSCOPY, COLON, DIAGNOSTIC      LAPAROTOMY Right 2/26/2024    EXPLORATORY LAPAROTOMY WITH RIGHT COLON RESECTION POSSIBLE OSTEOTOMY performed by Juan Fox MD at [facility] OR    TOTAL KNEE ARTHROPLASTY Left 3/14/2023    LEFT KNEE TOTAL ARTHROPLASTY performed by JOLEEN Campbell DO at [facility] OR    VASCULAR SURGERY      thoracic aortic aneurysm repair       No Known Allergies    Social History     Socioeconomic History    Marital status:      Spouse name: None    Number of children: None    Years of education: None    Highest education level: None   Tobacco Use    Smoking status: Never    Smokeless tobacco: Never   Vaping Use    Vaping Use: Never used   Substance and Sexual Activity    Alcohol use: Not Currently    Drug use: Never    Sexual activity: Defer     Social Determinants of Health     Food Insecurity: No Food Insecurity (2/26/2024)    Hunger Vital Sign     Worried About Running Out of Food in the Last Year: Never true     Ran Out of Food in the Last Year: Never true   Transportation Needs: No Transportation Needs (2/26/2024)    PRAPARE - Transportation     Lack of Transportation (Medical): No     Lack of Transportation (Non-Medical): No

## 2024-03-18 ENCOUNTER — OFFICE VISIT (OUTPATIENT)
Dept: SURGERY | Age: 79
End: 2024-03-18

## 2024-03-18 VITALS
DIASTOLIC BLOOD PRESSURE: 80 MMHG | HEIGHT: 66 IN | WEIGHT: 200 LBS | TEMPERATURE: 98.1 F | BODY MASS INDEX: 32.14 KG/M2 | HEART RATE: 77 BPM | SYSTOLIC BLOOD PRESSURE: 146 MMHG

## 2024-03-18 DIAGNOSIS — K57.32 DIVERTICULITIS OF COLON: Primary | ICD-10-CM

## 2024-03-18 PROCEDURE — 99024 POSTOP FOLLOW-UP VISIT: CPT | Performed by: SURGERY

## 2024-03-18 NOTE — PROGRESS NOTES
Surgery Progress Note            Chief complaint:   Patient Active Problem List   Diagnosis    Degenerative arthritis of left knee    Acute GI bleeding    Perforated diverticulum    Atrial fibrillation with RVR (HCC)       S: doing well    O:   Vitals:    03/18/24 1422   BP: (!) 146/80   Pulse: 77   Temp: 98.1 °F (36.7 °C)     No intake or output data in the 24 hours ending 03/18/24 1429        Labs:  No results for input(s): \"WBC\", \"HGB\", \"HCT\" in the last 72 hours.    Invalid input(s): \"PLR\"  Lab Results   Component Value Date    CREATININE 0.7 03/06/2024    BUN 8 03/06/2024     03/06/2024    K 3.4 (L) 03/06/2024     03/06/2024    CO2 26 03/06/2024     No results for input(s): \"LIPASE\", \"AMYLASE\" in the last 72 hours.    Physical exam:   BP (!) 146/80 (Site: Left Upper Arm, Position: Sitting, Cuff Size: Medium Adult)   Pulse 77   Temp 98.1 °F (36.7 °C)   Ht 1.676 m (5' 5.98\")   Wt 90.7 kg (200 lb)   BMI 32.30 kg/m²   General appearance: NAD  Head: NCAT  Neck: supple, no masses  Lungs: equal chest rise bilateral  Heart: S1S2 present  Abdomen: soft, nontender, nondistended  Skin; no new lesions, incisions clean and intact  Gu: no cva tenderness  Extremities: extremities normal, atraumatic, no cyanosis or edema    A:  Post op lap right hemicolectomy for bleeding and left axillary cyst removal    P: follow up as needed.     Juan Fox MD, MD  3/18/2024

## 2025-04-16 ENCOUNTER — HOSPITAL ENCOUNTER (OUTPATIENT)
Dept: AUDIOLOGY | Age: 80
Discharge: HOME | End: 2025-04-16
Payer: MEDICARE

## 2025-04-16 PROCEDURE — 92593 HC HEARING AID CHECK, BOTH EARS: CPT | Performed by: AUDIOLOGIST

## 2025-04-16 NOTE — PROGRESS NOTES
The patient came in for a hearing aid check.  Both hearing aids were cleaned and checked and found to be working well. Gain increased bilaterally.  No issues noted, she will return as needed.   Electronically signed by Daphne Omalley on 4/16/2025 at 12:45 PM

## (undated) DEVICE — GOWN,SIRUS,NONRNF,SETINSLV,XL,20/CS: Brand: MEDLINE

## (undated) DEVICE — NEEDLE FLTR 18GA L1.5IN MEM THK5UM BLNT DISP

## (undated) DEVICE — APPLIER CLP L9.375IN APER 2.1MM CLS L3.8MM 20 SM TI CLP

## (undated) DEVICE — TOWEL,OR,DSP,ST,BLUE,STD,6/PK,12PK/CS: Brand: MEDLINE

## (undated) DEVICE — Device

## (undated) DEVICE — PADDING,UNDERCAST,COTTON, 4"X4YD STERILE: Brand: MEDLINE

## (undated) DEVICE — TROCAR: Brand: KII FIOS FIRST ENTRY

## (undated) DEVICE — [HIGH FLOW INSUFFLATOR,  DO NOT USE IF PACKAGE IS DAMAGED,  KEEP DRY,  KEEP AWAY FROM SUNLIGHT,  PROTECT FROM HEAT AND RADIOACTIVE SOURCES.]: Brand: PNEUMOSURE

## (undated) DEVICE — HANDPIECE SET WITH BONE CLEANING TIP: Brand: INTERPULSE

## (undated) DEVICE — NDL CNTR 40CT FM MAG: Brand: MEDLINE INDUSTRIES, INC.

## (undated) DEVICE — SYRINGE MED 10ML TRNSLUC BRL PLUNG BLK MRK POLYPR CTRL

## (undated) DEVICE — INTENDED FOR TISSUE SEPARATION, AND OTHER PROCEDURES THAT REQUIRE A SHARP SURGICAL BLADE TO PUNCTURE OR CUT.: Brand: BARD-PARKER ® STAINLESS STEEL BLADES

## (undated) DEVICE — CLOTH SURG PREP PREOPERATIVE CHLORHEXIDINE GLUC 2% READYPREP

## (undated) DEVICE — BASIC DOUBLE BASIN 2-LF: Brand: MEDLINE INDUSTRIES, INC.

## (undated) DEVICE — 1LYRTR 16FR10ML100%SIL UMS SNP: Brand: MEDLINE INDUSTRIES, INC.

## (undated) DEVICE — COVER,TABLE,60X90,STERILE: Brand: MEDLINE

## (undated) DEVICE — RELOAD STPL L75MM OPN H3.8MM CLS 1.5MM WIRE DIA0.2MM REG

## (undated) DEVICE — PITCHER PT 1200ML W HNDL CSR WRP

## (undated) DEVICE — SEALER ENDOSCP L37CM NANO COAT BLNT TIP LAP DIV

## (undated) DEVICE — DOUBLE BASIN SET: Brand: MEDLINE INDUSTRIES, INC.

## (undated) DEVICE — GAUZE,SPONGE,4"X4",16PLY,STRL,LF,10/TRAY: Brand: MEDLINE

## (undated) DEVICE — BLADE,STAINLESS-STEEL,10,STRL,DISPOSABLE: Brand: MEDLINE

## (undated) DEVICE — APPLICATOR MEDICATED 26 CC SOLUTION HI LT ORNG CHLORAPREP

## (undated) DEVICE — SPONGE LAP W18XL18IN WHT COT 4 PLY FLD STRUNG RADPQ DISP ST

## (undated) DEVICE — CANNULA IV 18GA L15IN BLNT FILL LUERLOCK HUB MJCT

## (undated) DEVICE — SHEET DRAPE FULL 70X100

## (undated) DEVICE — YANKAUER,OPEN TIP,W/O VENT,STERILE: Brand: MEDLINE INDUSTRIES, INC.

## (undated) DEVICE — PAD,ABDOMINAL,8"X10",ST,LF: Brand: MEDLINE

## (undated) DEVICE — BLADE,STAINLESS-STEEL,15,STRL,DISPOSABLE: Brand: MEDLINE

## (undated) DEVICE — BANDAGE COMPR W6INXL5YD SELF ADH COHESIVE CO FLX

## (undated) DEVICE — BLADE ES ELASTOMERIC COAT INSUL DURABLE BEND UPTO 90DEG

## (undated) DEVICE — COVER,LIGHT HANDLE,FLX,1/PK: Brand: MEDLINE INDUSTRIES, INC.

## (undated) DEVICE — APPLIER CLP L L13IN TI MULT RNG HNDL 20 CLP STR LIGACLP

## (undated) DEVICE — TUBING, SUCTION, 1/4" X 10', STRAIGHT: Brand: MEDLINE

## (undated) DEVICE — WIPES SKIN CLOTH READYPREP 9 X 10.5 IN 2% CHLORHEX GLUCONATE CHG PREOP

## (undated) DEVICE — NEEDLE SPNL 22GA L3.5IN BLK HUB S STL REG WALL FIT STYL W/

## (undated) DEVICE — Z DISCONTINUED NO SUB SET EXTN L14IN 1ML IV L BOR NO FLTR NO STPCOCK

## (undated) DEVICE — GLOVE ORANGE PI 7 1/2   MSG9075

## (undated) DEVICE — ACCESS PLATFORM FOR MINIMALLY INVASIVE SURGERY: Brand: GELPOINT® ADVANCED ACCESS PLATFORM

## (undated) DEVICE — ELECTRODE ES 45CM LAP WIRE L HK COAT DISP CLEANCOAT

## (undated) DEVICE — PUMP SUC IRR TBNG L10FT W/ HNDPC ASSEMB STRYKEFLOW 2

## (undated) DEVICE — YANKAUER,POOLE TIP,STERILE,50/CS: Brand: MEDLINE

## (undated) DEVICE — STRYKER PERFORMANCE SERIES SAGITTAL BLADE: Brand: STRYKER PERFORMANCE SERIES

## (undated) DEVICE — ELECTRODE PT RET AD L9FT HI MOIST COND ADH HYDRGEL CORDED

## (undated) DEVICE — GLOVE ORANGE PI 8   MSG9080

## (undated) DEVICE — LAPAROSCOPIC SCISSORS: Brand: EPIX LAPAROSCOPIC SCISSORS

## (undated) DEVICE — MARKER,SKIN,WI/RULER AND LABELS: Brand: MEDLINE

## (undated) DEVICE — SYRINGE MED 30ML STD CLR PLAS LUERLOCK TIP N CTRL DISP

## (undated) DEVICE — SYRINGE IRRIG 60ML SFT PLIABLE BLB EZ TO GRP 1 HND USE W/

## (undated) DEVICE — GARMENT,MEDLINE,DVT,INT,CALF,MED, GEN2: Brand: MEDLINE

## (undated) DEVICE — GAUZE,SPONGE,4"X4",16PLY,XRAY,STRL,LF: Brand: MEDLINE

## (undated) DEVICE — DRESSING HYDROFIBER AQUACEL AG ADVANTAGE 3.5X12 IN

## (undated) DEVICE — MASTISOL ADHESIVE LIQ 2/3ML

## (undated) DEVICE — YANKAUER,BULB TIP,W/O VENT,RIGID,STERILE: Brand: MEDLINE

## (undated) DEVICE — GOWN,SIRUS,POLYRNF,RAGLAN,XL,ST,30/CS: Brand: MEDLINE

## (undated) DEVICE — SYRINGE MED 50ML LUERLOCK TIP

## (undated) DEVICE — SEALER/DIVIDER LAP SHFT L44CM DIA5MM STR BLNT TIP JAW HND

## (undated) DEVICE — SPONGE,PEANUT,XRAY,ST,SM,3/8",5/CARD: Brand: MEDLINE INDUSTRIES, INC.

## (undated) DEVICE — APPLIER LIG CLP M L11IN TI STR RNG HNDL FOR 20 CLP DISP

## (undated) DEVICE — UPCHARGE KNEE X3 PATELLA STRYKER

## (undated) DEVICE — BANDAGE COMPR W6INXL12FT SMOOTH FOR LIMB EXSANG ESMARCH

## (undated) DEVICE — DRAIN SURG 15FR L3/16IN DIA4.7MM SIL CHN RND HUBLESS FULL

## (undated) DEVICE — 4-PORT MANIFOLD: Brand: NEPTUNE 2

## (undated) DEVICE — LIQUIBAND RAPID ADHESIVE 36/CS 0.8ML: Brand: MEDLINE

## (undated) DEVICE — PACK,EXTREMITY,ORTHOMAX,5/CS: Brand: MEDLINE

## (undated) DEVICE — PAD,NON-ADHERENT,3X8,STERILE,LF,1/PK: Brand: MEDLINE

## (undated) DEVICE — NEEDLE HYPO 25GA L1.5IN BLU POLYPR HUB S STL REG BVL STR

## (undated) DEVICE — PACK SURG LAP CHOLE CUSTOM

## (undated) DEVICE — SPONGE LAP W18XL18IN WHT COT 4 PLY FLD STRUNG RADPQ DISP ST 2 PER PACK

## (undated) DEVICE — BLADE SAW SAG NAR THCK LNG 12.5MM

## (undated) DEVICE — TROCAR: Brand: KII SLEEVE

## (undated) DEVICE — BINDER ABD UNISX 4 PNL PREM 6INX6INX12IN L XL 4

## (undated) DEVICE — SEALER ENDOSCP NANO COAT OPN DIV CRV L JAW LIGASURE IMPACT

## (undated) DEVICE — 3M™ STERI-DRAPE™ U-DRAPE 1015: Brand: STERI-DRAPE™

## (undated) DEVICE — SOLUTION IRRIG 1000ML 0.9% SOD CHL USP POUR PLAS BTL

## (undated) DEVICE — STANDARD HYPODERMIC NEEDLE,POLYPROPYLENE HUB: Brand: MONOJECT

## (undated) DEVICE — TOWEL,OR,DSP,ST,GREEN,DLX,XR,4/PK,20PK/C: Brand: MEDLINE

## (undated) DEVICE — BANDAGE COMPR L W4INXL11YD 100% COT WVN E DBL LEN CLP CLSR

## (undated) DEVICE — GLOVE ORTHO 8   MSG9480